# Patient Record
Sex: MALE | Race: WHITE | NOT HISPANIC OR LATINO | Employment: FULL TIME | URBAN - METROPOLITAN AREA
[De-identification: names, ages, dates, MRNs, and addresses within clinical notes are randomized per-mention and may not be internally consistent; named-entity substitution may affect disease eponyms.]

---

## 2018-02-25 ENCOUNTER — HOSPITAL ENCOUNTER (EMERGENCY)
Facility: HOSPITAL | Age: 23
Discharge: HOME | End: 2018-02-25

## 2018-02-25 VITALS
BODY MASS INDEX: 39.99 KG/M2 | SYSTOLIC BLOOD PRESSURE: 138 MMHG | DIASTOLIC BLOOD PRESSURE: 90 MMHG | TEMPERATURE: 98 F | OXYGEN SATURATION: 98 % | HEIGHT: 65 IN | RESPIRATION RATE: 20 BRPM | HEART RATE: 104 BPM | WEIGHT: 240 LBS
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
## 2018-02-25 PROCEDURE — 99283 EMERGENCY DEPT VISIT LOW MDM: CPT

## 2018-02-25 RX ORDER — HYDROXYZINE HYDROCHLORIDE 25 MG/1
25 TABLET, FILM COATED ORAL EVERY 6 HOURS
Qty: 12 TABLET | Refills: 0 | Status: ON HOLD | OUTPATIENT
Start: 2018-02-25 | End: 2022-05-16 | Stop reason: HOSPADM

## 2018-02-25 RX ORDER — SULFAMETHOXAZOLE AND TRIMETHOPRIM 800; 160 MG/1; MG/1
1 TABLET ORAL 2 TIMES DAILY
Qty: 14 TABLET | Refills: 0 | Status: SHIPPED | OUTPATIENT
Start: 2018-02-25 | End: 2018-02-28 | Stop reason: ALTCHOICE

## 2018-02-25 NOTE — ED PROVIDER NOTES
Encounter Date: 2/25/2018       History     Chief Complaint   Patient presents with    Insect Bite     pt thinks spider bit him to left upper thigh. states it is red and warm. nontender     22-year-old male presents to emergency room complaining of insect bite to the left upper thigh that started earlier today.  Patient states he first noticed itching to his left thigh then noticed redness and believes he was bit by a spider.  Did not see a spider.  Denies any pain to the area.  Denies any drainage.  Denies any fever.  Reports up-to-date tetanus.          Review of patient's allergies indicates:  No Known Allergies  History reviewed. No pertinent past medical history.  Past Surgical History:   Procedure Laterality Date    CHOLECYSTECTOMY       History reviewed. No pertinent family history.  Social History   Substance Use Topics    Smoking status: Current Every Day Smoker     Packs/day: 1.50    Smokeless tobacco: Not on file    Alcohol use No     Review of Systems   Constitutional: Negative for fever.   HENT: Negative for sore throat.    Respiratory: Negative for shortness of breath.    Cardiovascular: Negative for chest pain.   Gastrointestinal: Negative for nausea.   Genitourinary: Negative for dysuria.   Musculoskeletal: Negative for back pain.   Skin: Positive for rash.   Neurological: Negative for weakness.   Hematological: Does not bruise/bleed easily.   All other systems reviewed and are negative.      Physical Exam     Initial Vitals [02/25/18 1546]   BP Pulse Resp Temp SpO2   (!) 138/90 104 20 98.1 °F (36.7 °C) 98 %      MAP       106         Physical Exam    Nursing note and vitals reviewed.  Constitutional: He appears well-developed and well-nourished. He is not diaphoretic. No distress.   HENT:   Head: Normocephalic and atraumatic.   Eyes: Conjunctivae are normal.   Neck: Normal range of motion. Neck supple.   Cardiovascular: Normal rate and regular rhythm.   Pulmonary/Chest: Breath sounds normal. No  respiratory distress. He exhibits no tenderness.   Abdominal: Soft. He exhibits no distension. There is no tenderness.   Musculoskeletal: Normal range of motion. He exhibits no tenderness.   Neurological: He is alert and oriented to person, place, and time. He has normal strength.   Skin: Skin is warm and dry. Capillary refill takes less than 2 seconds. There is erythema (5cm area of redness to L thigh).   Psychiatric: He has a normal mood and affect. His behavior is normal. Judgment and thought content normal.         ED Course   Procedures  Labs Reviewed - No data to display          Medical Decision Making:   Initial Assessment:   22-year-old male presents to emergency room complaining of insect bite to the left upper thigh that started earlier today.  Patient states he first noticed itching to his left thigh then noticed redness and believes he was bit by a spider.  Did not see a spider.  Denies any pain to the area.  Denies any drainage.  Denies any fever.  Reports up-to-date tetanus.  Differential Diagnosis:   Abscess, ALLERGIC reaction, cellulitis, folliculitis,  ED Management:  Patient will be discharged with antibiotic and hydroxyzine.  Instructed to apply warm compresses to the area.  Monitor for worsening signs of infection.  Return to emergency room if any worsening symptoms present.  Patient verbalizes understanding.                      Clinical Impression:   The encounter diagnosis was Insect bite, initial encounter.                           Taryn Chino NP  02/25/18 [id number]

## 2018-02-28 ENCOUNTER — HOSPITAL ENCOUNTER (EMERGENCY)
Facility: HOSPITAL | Age: 23
Discharge: HOME | End: 2018-02-28
Attending: SURGERY

## 2018-02-28 VITALS
SYSTOLIC BLOOD PRESSURE: 117 MMHG | WEIGHT: 245 LBS | HEART RATE: 98 BPM | RESPIRATION RATE: 18 BRPM | HEIGHT: 68 IN | OXYGEN SATURATION: 96 % | DIASTOLIC BLOOD PRESSURE: 58 MMHG | TEMPERATURE: 99 F | BODY MASS INDEX: 37.13 KG/M2

## 2018-02-28 DIAGNOSIS — L03.116 CELLULITIS OF LEFT LOWER EXTREMITY: Primary | ICD-10-CM

## 2018-02-28 PROCEDURE — 10060 I&D ABSCESS SIMPLE/SINGLE: CPT | Mod: LT

## 2018-02-28 PROCEDURE — 63600175 PHARM REV CODE 636 W HCPCS: Performed by: PHYSICIAN ASSISTANT

## 2018-02-28 PROCEDURE — 99283 EMERGENCY DEPT VISIT LOW MDM: CPT | Mod: 25

## 2018-02-28 PROCEDURE — 96372 THER/PROPH/DIAG INJ SC/IM: CPT

## 2018-02-28 PROCEDURE — 25000003 PHARM REV CODE 250: Performed by: PHYSICIAN ASSISTANT

## 2018-02-28 RX ORDER — LIDOCAINE HYDROCHLORIDE 10 MG/ML
10 INJECTION INFILTRATION; PERINEURAL
Status: COMPLETED | OUTPATIENT
Start: 2018-02-28 | End: 2018-02-28

## 2018-02-28 RX ORDER — CEFTRIAXONE 1 G/1
1 INJECTION, POWDER, FOR SOLUTION INTRAMUSCULAR; INTRAVENOUS
Status: COMPLETED | OUTPATIENT
Start: 2018-02-28 | End: 2018-02-28

## 2018-02-28 RX ORDER — CLINDAMYCIN HYDROCHLORIDE 300 MG/1
300 CAPSULE ORAL EVERY 6 HOURS
Qty: 28 CAPSULE | Refills: 0 | Status: SHIPPED | OUTPATIENT
Start: 2018-02-28 | End: 2018-03-07

## 2018-02-28 RX ADMIN — LIDOCAINE HYDROCHLORIDE 10 ML: 10 INJECTION, SOLUTION INFILTRATION; PERINEURAL at 07:02

## 2018-02-28 RX ADMIN — CEFTRIAXONE SODIUM 1 G: 1 INJECTION, POWDER, FOR SOLUTION INTRAMUSCULAR; INTRAVENOUS at 07:02

## 2018-03-01 NOTE — DISCHARGE INSTRUCTIONS
Stop taking the bactrim and change to the new antibiotics.  Keep the area clean and dry.  See your primary care provider in a few days.  For worsening symptoms, chest pain, shortness of breath, increased abdominal pain, high grade fever, stroke or stroke like symptoms, immediately go to the nearest Emergency Room or call 911 as soon as possible.

## 2018-03-01 NOTE — ED PROVIDER NOTES
"Encounter Date: 2/28/2018       History     Chief Complaint   Patient presents with    Abscess     Pt states has "Spider bite" to left thigh.  States was seen 4 days ago and given antibiotics, states "it's much bigger now."  Denies drainage, denies seeing spider.      Patient is a 22 year old male who presents with worsening abscess to the left thigh. He was seen here on 2/25 and started on Bactrim, he reports worsening redness despite being on the bactrim. He reports mild tenderness. No drainage. His family suspects a spider bite but the patient denies seeing a spider. He reports being compliant with his antibiotics. He denied fever or chills.      The history is provided by the patient.     Review of patient's allergies indicates:  No Known Allergies  History reviewed. No pertinent past medical history.  Past Surgical History:   Procedure Laterality Date    CHOLECYSTECTOMY       History reviewed. No pertinent family history.  Social History   Substance Use Topics    Smoking status: Current Every Day Smoker     Packs/day: 1.50    Smokeless tobacco: Not on file    Alcohol use No     Review of Systems   Constitutional: Negative for activity change, appetite change, chills and fever.   HENT: Negative for congestion, rhinorrhea and sore throat.    Eyes: Negative for redness and visual disturbance.   Respiratory: Negative for cough, chest tightness and shortness of breath.    Cardiovascular: Negative for chest pain.   Gastrointestinal: Negative for abdominal pain, diarrhea, nausea and vomiting.   Genitourinary: Negative for dysuria and frequency.   Musculoskeletal: Negative for back pain, neck pain and neck stiffness.   Skin: Positive for color change and wound. Negative for rash.   Neurological: Negative for dizziness, syncope, numbness and headaches.       Physical Exam     Vitals:    02/28/18 1807 02/28/18 1950 02/28/18 1954   BP: (!) 140/79  (!) 117/58   BP Location: Left arm  Right arm   Patient Position: " "Sitting  Lying   Pulse: (!) 113 98 98   Resp: 20  18   Temp: 98.8 °F (37.1 °C)  98.9 °F (37.2 °C)   TempSrc: Oral  Oral   SpO2: 100%  96%   Weight: 111.1 kg (245 lb)     Height: 5' 8" (1.727 m)         Physical Exam    Constitutional: Vital signs are normal. He appears well-developed and well-nourished. He is cooperative.  Non-toxic appearance. He does not have a sickly appearance.   HENT:   Head: Normocephalic and atraumatic.   Right Ear: External ear normal.   Left Ear: External ear normal.   Nose: Nose normal.   Mouth/Throat: Oropharynx is clear and moist.   Eyes: Conjunctivae and lids are normal. Pupils are equal, round, and reactive to light.   Neck: Normal range of motion and full passive range of motion without pain. Neck supple.   Cardiovascular: Normal rate and regular rhythm.   Pulmonary/Chest: Breath sounds normal. He has no wheezes. He has no rales.   Abdominal: Soft. Normal appearance. There is no tenderness. There is no rigidity, no rebound and no guarding.   Neurological: He is alert and oriented to person, place, and time.   Skin: Skin is warm, dry and intact. Abscess noted. No rash noted. There is erythema.        Large 10 cm x 10 cm area of induration. There is a small area of induration in the center. There is no significant fluctuance. Mild tenderness to palpation.          ED Course   I & D - Incision and Drainage  Date/Time: 2/28/2018 10:20 PM  Performed by: YADI ARMSTRONG  Authorized by: NICOLASA BONILLA III   Type: abscess  Body area: lower extremity  Location details: left leg  Anesthesia: local infiltration    Anesthesia:  Local Anesthetic: lidocaine 1% without epinephrine  Anesthetic total: 3 mL  Scalpel size: 11  Incision type: single straight  Complexity: simple  Drainage: bloody  Drainage amount: scant  Wound treatment: incision  Patient tolerance: Patient tolerated the procedure well with no immediate complications        Labs Reviewed - No data to display        "   Medical Decision Making:   History:   Old Medical Records: I decided to obtain old medical records.  Initial Assessment:   Patient is a 22 year old male who presents with worsening abscess to the left thigh. He was seen here on 2/25 and started on Bactrim, he reports worsening redness despite being on the bactrim. He reports mild tenderness. No drainage. His family suspects a spider bite but the patient denies seeing a spider. He reports being compliant with his antibiotics. He denied fever or chills.  Differential Diagnosis:   Abscess  Cellulitis  folliculitis   ED Management:  Patient denied fever. I&D performed, see procedure note. Patient tolerated well. Patient was given instructions on wound care. Antibiotics changed to clindamcyin. He was given a dose of IM rocephin. Follow up with primary care provider. Return precautions given. All questions answered. Case was discussed with Dr. Hill who has evaluated the patient and is in agreement with the plan of care.                         Clinical Impression:   The encounter diagnosis was Cellulitis of left lower extremity.                           Tina Salinas PA-C  02/28/18 0041

## 2022-05-13 ENCOUNTER — HOSPITAL ENCOUNTER (INPATIENT)
Facility: HOSPITAL | Age: 27
LOS: 3 days | Discharge: HOME | DRG: 270 | End: 2022-05-17
Attending: EMERGENCY MEDICINE | Admitting: EMERGENCY MEDICINE
Payer: COMMERCIAL

## 2022-05-13 DIAGNOSIS — I82.402 ACUTE DEEP VEIN THROMBOSIS (DVT) OF LEFT LOWER EXTREMITY, UNSPECIFIED VEIN: Primary | ICD-10-CM

## 2022-05-13 DIAGNOSIS — I26.99 ACUTE PULMONARY EMBOLISM, UNSPECIFIED PULMONARY EMBOLISM TYPE, UNSPECIFIED WHETHER ACUTE COR PULMONALE PRESENT: ICD-10-CM

## 2022-05-13 DIAGNOSIS — F90.8 ATTENTION DEFICIT HYPERACTIVITY DISORDER (ADHD), OTHER TYPE: ICD-10-CM

## 2022-05-13 DIAGNOSIS — M79.89 SWELLING OF LEFT LOWER EXTREMITY: ICD-10-CM

## 2022-05-13 DIAGNOSIS — E66.01 CLASS 2 SEVERE OBESITY WITH SERIOUS COMORBIDITY AND BODY MASS INDEX (BMI) OF 38.0 TO 38.9 IN ADULT, UNSPECIFIED OBESITY TYPE: ICD-10-CM

## 2022-05-13 DIAGNOSIS — I26.99 BILATERAL PULMONARY EMBOLISM: ICD-10-CM

## 2022-05-13 DIAGNOSIS — R00.0 TACHYCARDIA: ICD-10-CM

## 2022-05-13 DIAGNOSIS — I82.412 ACUTE DEEP VEIN THROMBOSIS (DVT) OF FEMORAL VEIN OF LEFT LOWER EXTREMITY: ICD-10-CM

## 2022-05-13 DIAGNOSIS — R07.9 CHEST PAIN: ICD-10-CM

## 2022-05-13 DIAGNOSIS — Z72.0 TOBACCO USE: ICD-10-CM

## 2022-05-13 DIAGNOSIS — I10 ESSENTIAL HYPERTENSION: ICD-10-CM

## 2022-05-13 DIAGNOSIS — F90.9 ATTENTION DEFICIT HYPERACTIVITY DISORDER (ADHD), UNSPECIFIED ADHD TYPE: ICD-10-CM

## 2022-05-13 PROBLEM — E66.812 CLASS 2 SEVERE OBESITY WITH SERIOUS COMORBIDITY IN ADULT: Chronic | Status: ACTIVE | Noted: 2022-05-13

## 2022-05-13 LAB
ALBUMIN SERPL BCP-MCNC: 3.8 G/DL (ref 3.5–5.2)
ALP SERPL-CCNC: 66 U/L (ref 55–135)
ALT SERPL W/O P-5'-P-CCNC: 27 U/L (ref 10–44)
ANION GAP SERPL CALC-SCNC: 10 MMOL/L (ref 8–16)
AST SERPL-CCNC: 20 U/L (ref 10–40)
BASOPHILS # BLD AUTO: 0.1 K/UL (ref 0–0.2)
BASOPHILS NFR BLD: 0.8 % (ref 0–1.9)
BILIRUB SERPL-MCNC: 0.7 MG/DL (ref 0.1–1)
BILIRUB UR QL STRIP: NEGATIVE
BNP SERPL-MCNC: <10 PG/ML (ref 0–99)
BUN SERPL-MCNC: 16 MG/DL (ref 6–20)
CALCIUM SERPL-MCNC: 9.1 MG/DL (ref 8.7–10.5)
CHLORIDE SERPL-SCNC: 104 MMOL/L (ref 95–110)
CLARITY UR: CLEAR
CO2 SERPL-SCNC: 25 MMOL/L (ref 23–29)
COLOR UR: YELLOW
CREAT SERPL-MCNC: 1 MG/DL (ref 0.5–1.4)
CTP QC/QA: YES
D DIMER PPP IA.FEU-MCNC: 12.72 MG/L FEU
DIFFERENTIAL METHOD: ABNORMAL
EOSINOPHIL # BLD AUTO: 0.3 K/UL (ref 0–0.5)
EOSINOPHIL NFR BLD: 2.5 % (ref 0–8)
ERYTHROCYTE [DISTWIDTH] IN BLOOD BY AUTOMATED COUNT: 12.1 % (ref 11.5–14.5)
EST. GFR  (AFRICAN AMERICAN): >60 ML/MIN/1.73 M^2
EST. GFR  (NON AFRICAN AMERICAN): >60 ML/MIN/1.73 M^2
GLUCOSE SERPL-MCNC: 103 MG/DL (ref 70–110)
GLUCOSE UR QL STRIP: NEGATIVE
HCT VFR BLD AUTO: 44.9 % (ref 40–54)
HGB BLD-MCNC: 15.1 G/DL (ref 14–18)
HGB UR QL STRIP: NEGATIVE
IMM GRANULOCYTES # BLD AUTO: 0.08 K/UL (ref 0–0.04)
IMM GRANULOCYTES NFR BLD AUTO: 0.6 % (ref 0–0.5)
KETONES UR QL STRIP: NEGATIVE
LEUKOCYTE ESTERASE UR QL STRIP: NEGATIVE
LYMPHOCYTES # BLD AUTO: 2.5 K/UL (ref 1–4.8)
LYMPHOCYTES NFR BLD: 19.5 % (ref 18–48)
MCH RBC QN AUTO: 29.8 PG (ref 27–31)
MCHC RBC AUTO-ENTMCNC: 33.6 G/DL (ref 32–36)
MCV RBC AUTO: 89 FL (ref 82–98)
MONOCYTES # BLD AUTO: 1.1 K/UL (ref 0.3–1)
MONOCYTES NFR BLD: 8.1 % (ref 4–15)
NEUTROPHILS # BLD AUTO: 8.9 K/UL (ref 1.8–7.7)
NEUTROPHILS NFR BLD: 68.5 % (ref 38–73)
NITRITE UR QL STRIP: NEGATIVE
NRBC BLD-RTO: 0 /100 WBC
PH UR STRIP: 6 [PH] (ref 5–8)
PLATELET # BLD AUTO: 133 K/UL (ref 150–450)
PMV BLD AUTO: 10.3 FL (ref 9.2–12.9)
POTASSIUM SERPL-SCNC: 4.3 MMOL/L (ref 3.5–5.1)
PROT SERPL-MCNC: 7.6 G/DL (ref 6–8.4)
PROT UR QL STRIP: NEGATIVE
RBC # BLD AUTO: 5.07 M/UL (ref 4.6–6.2)
SARS-COV-2 RDRP RESP QL NAA+PROBE: NEGATIVE
SODIUM SERPL-SCNC: 139 MMOL/L (ref 136–145)
SP GR UR STRIP: 1.02 (ref 1–1.03)
TROPONIN I SERPL DL<=0.01 NG/ML-MCNC: <0.006 NG/ML (ref 0–0.03)
URN SPEC COLLECT METH UR: NORMAL
UROBILINOGEN UR STRIP-ACNC: NEGATIVE EU/DL
WBC # BLD AUTO: 12.97 K/UL (ref 3.9–12.7)

## 2022-05-13 PROCEDURE — 99285 EMERGENCY DEPT VISIT HI MDM: CPT | Mod: 25

## 2022-05-13 PROCEDURE — G0378 HOSPITAL OBSERVATION PER HR: HCPCS

## 2022-05-13 PROCEDURE — 63600175 PHARM REV CODE 636 W HCPCS: Performed by: PHYSICIAN ASSISTANT

## 2022-05-13 PROCEDURE — 25500020 PHARM REV CODE 255: Performed by: EMERGENCY MEDICINE

## 2022-05-13 PROCEDURE — 84484 ASSAY OF TROPONIN QUANT: CPT | Performed by: PHYSICIAN ASSISTANT

## 2022-05-13 PROCEDURE — 85379 FIBRIN DEGRADATION QUANT: CPT | Performed by: PHYSICIAN ASSISTANT

## 2022-05-13 PROCEDURE — 96372 THER/PROPH/DIAG INJ SC/IM: CPT | Mod: 59 | Performed by: PHYSICIAN ASSISTANT

## 2022-05-13 PROCEDURE — 83880 ASSAY OF NATRIURETIC PEPTIDE: CPT | Performed by: PHYSICIAN ASSISTANT

## 2022-05-13 PROCEDURE — 96376 TX/PRO/DX INJ SAME DRUG ADON: CPT | Performed by: EMERGENCY MEDICINE

## 2022-05-13 PROCEDURE — 93010 ELECTROCARDIOGRAM REPORT: CPT | Mod: ,,, | Performed by: INTERNAL MEDICINE

## 2022-05-13 PROCEDURE — 85025 COMPLETE CBC W/AUTO DIFF WBC: CPT | Performed by: PHYSICIAN ASSISTANT

## 2022-05-13 PROCEDURE — 25000003 PHARM REV CODE 250: Performed by: PHYSICIAN ASSISTANT

## 2022-05-13 PROCEDURE — 93005 ELECTROCARDIOGRAM TRACING: CPT

## 2022-05-13 PROCEDURE — 63600175 PHARM REV CODE 636 W HCPCS: Performed by: HOSPITALIST

## 2022-05-13 PROCEDURE — 96374 THER/PROPH/DIAG INJ IV PUSH: CPT

## 2022-05-13 PROCEDURE — 96375 TX/PRO/DX INJ NEW DRUG ADDON: CPT

## 2022-05-13 PROCEDURE — 93010 EKG 12-LEAD: ICD-10-PCS | Mod: ,,, | Performed by: INTERNAL MEDICINE

## 2022-05-13 PROCEDURE — U0002 COVID-19 LAB TEST NON-CDC: HCPCS | Performed by: PHYSICIAN ASSISTANT

## 2022-05-13 PROCEDURE — 96376 TX/PRO/DX INJ SAME DRUG ADON: CPT

## 2022-05-13 PROCEDURE — 80053 COMPREHEN METABOLIC PANEL: CPT | Performed by: PHYSICIAN ASSISTANT

## 2022-05-13 PROCEDURE — 81003 URINALYSIS AUTO W/O SCOPE: CPT | Performed by: PHYSICIAN ASSISTANT

## 2022-05-13 PROCEDURE — 25000003 PHARM REV CODE 250: Performed by: HOSPITALIST

## 2022-05-13 PROCEDURE — 96361 HYDRATE IV INFUSION ADD-ON: CPT

## 2022-05-13 RX ORDER — PROCHLORPERAZINE EDISYLATE 5 MG/ML
5 INJECTION INTRAMUSCULAR; INTRAVENOUS EVERY 6 HOURS PRN
Status: DISCONTINUED | OUTPATIENT
Start: 2022-05-13 | End: 2022-05-17 | Stop reason: HOSPADM

## 2022-05-13 RX ORDER — SODIUM CHLORIDE 0.9 % (FLUSH) 0.9 %
10 SYRINGE (ML) INJECTION EVERY 8 HOURS PRN
Status: DISCONTINUED | OUTPATIENT
Start: 2022-05-13 | End: 2022-05-17 | Stop reason: HOSPADM

## 2022-05-13 RX ORDER — ONDANSETRON 2 MG/ML
4 INJECTION INTRAMUSCULAR; INTRAVENOUS EVERY 8 HOURS PRN
Status: DISCONTINUED | OUTPATIENT
Start: 2022-05-13 | End: 2022-05-17 | Stop reason: HOSPADM

## 2022-05-13 RX ORDER — OXYCODONE AND ACETAMINOPHEN 5; 325 MG/1; MG/1
1 TABLET ORAL EVERY 4 HOURS PRN
Status: DISCONTINUED | OUTPATIENT
Start: 2022-05-13 | End: 2022-05-17 | Stop reason: HOSPADM

## 2022-05-13 RX ORDER — ACETAMINOPHEN 325 MG/1
650 TABLET ORAL EVERY 6 HOURS PRN
Status: DISCONTINUED | OUTPATIENT
Start: 2022-05-13 | End: 2022-05-17 | Stop reason: HOSPADM

## 2022-05-13 RX ORDER — SIMETHICONE 80 MG
1 TABLET,CHEWABLE ORAL 4 TIMES DAILY PRN
Status: DISCONTINUED | OUTPATIENT
Start: 2022-05-13 | End: 2022-05-17 | Stop reason: HOSPADM

## 2022-05-13 RX ORDER — PROPRANOLOL HYDROCHLORIDE 40 MG/1
40 TABLET ORAL 2 TIMES DAILY
Status: DISCONTINUED | OUTPATIENT
Start: 2022-05-13 | End: 2022-05-17 | Stop reason: HOSPADM

## 2022-05-13 RX ORDER — MORPHINE SULFATE 4 MG/ML
3 INJECTION, SOLUTION INTRAMUSCULAR; INTRAVENOUS
Status: COMPLETED | OUTPATIENT
Start: 2022-05-13 | End: 2022-05-13

## 2022-05-13 RX ORDER — ONDANSETRON 2 MG/ML
4 INJECTION INTRAMUSCULAR; INTRAVENOUS
Status: COMPLETED | OUTPATIENT
Start: 2022-05-13 | End: 2022-05-13

## 2022-05-13 RX ORDER — ENOXAPARIN SODIUM 150 MG/ML
1 INJECTION SUBCUTANEOUS
Status: COMPLETED | OUTPATIENT
Start: 2022-05-13 | End: 2022-05-13

## 2022-05-13 RX ORDER — MAG HYDROX/ALUMINUM HYD/SIMETH 200-200-20
30 SUSPENSION, ORAL (FINAL DOSE FORM) ORAL 4 TIMES DAILY PRN
Status: DISCONTINUED | OUTPATIENT
Start: 2022-05-13 | End: 2022-05-17 | Stop reason: HOSPADM

## 2022-05-13 RX ORDER — IBUPROFEN 200 MG
24 TABLET ORAL
Status: DISCONTINUED | OUTPATIENT
Start: 2022-05-13 | End: 2022-05-17 | Stop reason: HOSPADM

## 2022-05-13 RX ORDER — IPRATROPIUM BROMIDE AND ALBUTEROL SULFATE 2.5; .5 MG/3ML; MG/3ML
3 SOLUTION RESPIRATORY (INHALATION) EVERY 4 HOURS PRN
Status: DISCONTINUED | OUTPATIENT
Start: 2022-05-13 | End: 2022-05-17 | Stop reason: HOSPADM

## 2022-05-13 RX ORDER — PROPRANOLOL HYDROCHLORIDE 40 MG/1
40 TABLET ORAL 2 TIMES DAILY
COMMUNITY
Start: 2022-03-04

## 2022-05-13 RX ORDER — NALOXONE HCL 0.4 MG/ML
0.02 VIAL (ML) INJECTION
Status: DISCONTINUED | OUTPATIENT
Start: 2022-05-13 | End: 2022-05-17 | Stop reason: HOSPADM

## 2022-05-13 RX ORDER — POLYETHYLENE GLYCOL 3350 17 G/17G
17 POWDER, FOR SOLUTION ORAL DAILY
Status: DISCONTINUED | OUTPATIENT
Start: 2022-05-14 | End: 2022-05-17 | Stop reason: HOSPADM

## 2022-05-13 RX ORDER — ENOXAPARIN SODIUM 150 MG/ML
1 INJECTION SUBCUTANEOUS
Status: DISCONTINUED | OUTPATIENT
Start: 2022-05-13 | End: 2022-05-17 | Stop reason: HOSPADM

## 2022-05-13 RX ORDER — TALC
6 POWDER (GRAM) TOPICAL NIGHTLY PRN
Status: DISCONTINUED | OUTPATIENT
Start: 2022-05-13 | End: 2022-05-17 | Stop reason: HOSPADM

## 2022-05-13 RX ORDER — GLUCAGON 1 MG
1 KIT INJECTION
Status: DISCONTINUED | OUTPATIENT
Start: 2022-05-13 | End: 2022-05-17 | Stop reason: HOSPADM

## 2022-05-13 RX ORDER — IBUPROFEN 200 MG
16 TABLET ORAL
Status: DISCONTINUED | OUTPATIENT
Start: 2022-05-13 | End: 2022-05-17 | Stop reason: HOSPADM

## 2022-05-13 RX ADMIN — ONDANSETRON 4 MG: 2 INJECTION INTRAMUSCULAR; INTRAVENOUS at 10:05

## 2022-05-13 RX ADMIN — ENOXAPARIN SODIUM 105 MG: 120 INJECTION, SOLUTION INTRAVENOUS; SUBCUTANEOUS at 07:05

## 2022-05-13 RX ADMIN — SODIUM CHLORIDE 1000 ML: 0.9 INJECTION, SOLUTION INTRAVENOUS at 06:05

## 2022-05-13 RX ADMIN — ONDANSETRON 4 MG: 2 INJECTION INTRAMUSCULAR; INTRAVENOUS at 06:05

## 2022-05-13 RX ADMIN — IOHEXOL 85 ML: 350 INJECTION, SOLUTION INTRAVENOUS at 07:05

## 2022-05-13 RX ADMIN — MORPHINE SULFATE 3 MG: 4 INJECTION INTRAVENOUS at 06:05

## 2022-05-13 RX ADMIN — OXYCODONE AND ACETAMINOPHEN 1 TABLET: 5; 325 TABLET ORAL at 10:05

## 2022-05-13 RX ADMIN — MORPHINE SULFATE 3 MG: 4 INJECTION INTRAVENOUS at 08:05

## 2022-05-13 RX ADMIN — PROPRANOLOL HYDROCHLORIDE 40 MG: 40 TABLET ORAL at 11:05

## 2022-05-13 NOTE — Clinical Note
The groin was prepped. The site was prepped with ChloraPrep. The site was clipped. The patient was draped. The patient was positioned supine. Bilateral popliteal

## 2022-05-13 NOTE — ED TRIAGE NOTES
"Pt. States, " I think I have a blood clot in my leg". Pt. reports he has discoloration, swelling, tenderness and pain to his left leg. Pt. Denies any long travel or sanitary habits. Pt. Reports he has a hx of rapid HR and HTN.   "

## 2022-05-13 NOTE — Clinical Note
0 ml of contrast were injected throughout the case. 50 mL of contrast was the total wasted during the case. 50 mL was the total amount used during the case.

## 2022-05-13 NOTE — ED PROVIDER NOTES
Encounter Date: 5/13/2022       History     Chief Complaint   Patient presents with    Leg Pain     Pt c/o left leg pain and swelling x4 days. Pt stated he had hx of blood clots.     26-year-old male with history of hypertension and previous DVT (no longer anticoagulated) presents to the emergency department for 5 day history of atraumatic left lower extremity pain associated with swelling.  Symptoms feel similar to his past DVT.  Denies chest pain and shortness of breath.  States he has history of sinus tachycardia that he takes propranolol for; compliant with propanolol today.  No medication prior to arrival.  Denies numbness and fever.    The history is provided by the patient.     Review of patient's allergies indicates:  No Known Allergies  Past Medical History:   Diagnosis Date    Hypertension      Past Surgical History:   Procedure Laterality Date    CHOLECYSTECTOMY       History reviewed. No pertinent family history.  Social History     Tobacco Use    Smoking status: Current Every Day Smoker     Packs/day: 1.50    Smokeless tobacco: Never Used   Substance Use Topics    Alcohol use: No    Drug use: Never     Review of Systems   Constitutional: Negative for fever.   Respiratory: Negative for shortness of breath.    Cardiovascular: Negative for chest pain.   Gastrointestinal: Negative for abdominal pain, nausea and vomiting.   Musculoskeletal: Positive for myalgias. Negative for back pain, joint swelling and neck pain.   Skin: Negative for color change and wound.   Neurological: Negative for numbness.   All other systems reviewed and are negative.      Physical Exam     Initial Vitals [05/13/22 1559]   BP Pulse Resp Temp SpO2   (!) 139/90 (!) 113 20 98.9 °F (37.2 °C) 99 %      MAP       --         Physical Exam    Nursing note and vitals reviewed.  Constitutional: He appears well-developed and well-nourished. He is not diaphoretic. No distress.   HENT:   Head: Normocephalic and atraumatic.   Nose: Nose  normal.   Eyes: Conjunctivae and EOM are normal. Pupils are equal, round, and reactive to light. Right eye exhibits no discharge. Left eye exhibits no discharge.   Neck: No tracheal deviation present. No JVD present.   Normal range of motion.  Cardiovascular: Regular rhythm and normal heart sounds. Tachycardia present.  Exam reveals no friction rub.    No murmur heard.  Pulmonary/Chest: Breath sounds normal. No stridor. No respiratory distress. He has no wheezes. He has no rhonchi. He has no rales. He exhibits no tenderness.   Musculoskeletal:         General: Normal range of motion.      Cervical back: Normal range of motion.      Comments: Unilateral swelling with associated tenderness to the left lower extremity along the posterior aspect.  No erythema or bony deformities.  Full ROM of lower extremity.  Distal extremity pulses 2+ and equal.  Ambulatory.     Neurological: He is alert and oriented to person, place, and time.   Skin: Skin is warm and dry. No rash and no abscess noted. No erythema. No pallor.         ED Course   Procedures  Labs Reviewed   CBC W/ AUTO DIFFERENTIAL - Abnormal; Notable for the following components:       Result Value    WBC 12.97 (*)     Platelets 133 (*)     Immature Granulocytes 0.6 (*)     Gran # (ANC) 8.9 (*)     Immature Grans (Abs) 0.08 (*)     Mono # 1.1 (*)     All other components within normal limits   D DIMER, QUANTITATIVE - Abnormal; Notable for the following components:    D-Dimer 12.72 (*)     All other components within normal limits   COMPREHENSIVE METABOLIC PANEL   TROPONIN I   B-TYPE NATRIURETIC PEPTIDE   URINALYSIS, REFLEX TO URINE CULTURE    Narrative:     Specimen Source->Urine   SARS-COV-2 RDRP GENE          Imaging Results           CTA Chest Non-Coronary (PE Study) (Final result)  Result time 05/13/22 20:11:05    Final result by Olivia St MD (05/13/22 20:11:05)                 Impression:      Suboptimal timing of the intravenous bolus.  However,  filling defects are seen bilaterally in the pulmonary arteries.  Findings suggest bilateral acute pulmonary emboli.    Pulmonary trunk greater in caliber than the corresponding ascending thoracic aorta.  Possibility of pulmonary hypertension should be considered.    Subtle bilateral ground-glass opacities.  Findings are nonspecific and may represent pneumonitis, crowding of the bronchovascular structures, atelectasis, or other etiology.  Moderate bilateral linear scarring or atelectasis.    Findings called to DILMA Mtz, at 20:05 on 05/13/2022.    This report was flagged in [employer] as abnormal.      Electronically signed by: Olivia St  Date:    05/13/2022  Time:    20:11             Narrative:    EXAMINATION:  CT PULMONARY ANGIOGRAM WITH CONTRAST    CLINICAL HISTORY:  Complaining of left leg pain and swelling x4 days.    TECHNIQUE:  CT of the chest with intravenous contrast for pulmonary artery angiogram was performed. Contiguous axial 1.25 mm images followed by 10 mm reconstructions with multiplanar and MIP reformations of the pulmonary arteries. No 3D post-angiographic imaging was performed on an independent workstation and reviewed.  Eighty-five ml of Omnipaque 350 was injected.    COMPARISON:  None.    FINDINGS:  There is suboptimal timing of the intravenous bolus.  Despite this, there are filling defects seen in bilateral pulmonary arteries.  There is no aortic aneurysm or aortic dissection.  The pulmonary trunk is greater in caliber than the adjacent ascending thoracic aorta.    There is subtle ground-glass opacities seen bilaterally.  There is moderate linear atelectasis or scarring.    There is no evidence of mediastinal, hilar, or axillary adenopathy.    There is no pleural or pericardial effusion.    The heart size is within normal limits.    In the visualized upper abdomen, there is fatty infiltration of the liver.  The gallbladder surgically absent..                               US Lower  Extremity Veins Left (Final result)  Result time 05/13/22 19:43:53    Final result by Alber Bernard MD (05/13/22 19:43:53)                 Impression:      Thrombosis of all of the deep veins in the left lower extremity.    Case discussed with DILMA Gray on 05/13/2022 at 19:42.      Electronically signed by: Alber Bernard MD  Date:    05/13/2022  Time:    19:43             Narrative:    EXAMINATION:  US LOWER EXTREMITY VEINS LEFT    CLINICAL HISTORY:  Other specified soft tissue disorders    TECHNIQUE:  Duplex and color flow Doppler evaluation and graded compression of the left lower extremity veins was performed.    COMPARISON:  None    FINDINGS:  Left thigh veins: The common femoral, femoral, popliteal, and deep femoral veins are thrombosed.    Left calf veins: The visualized calf veins are also thrombosed.    Contralateral CFV: The contralateral (right) common femoral vein is patent and free of thrombus.    Miscellaneous: None                               X-Ray Chest AP Portable (Final result)  Result time 05/13/22 18:23:20    Final result by Alber Bernard MD (05/13/22 18:23:20)                 Impression:      Vague perihilar airspace opacities.      Electronically signed by: Alber Bernard MD  Date:    05/13/2022  Time:    18:23             Narrative:    EXAMINATION:  XR CHEST AP PORTABLE    CLINICAL HISTORY:  Tachycardia, unspecified    TECHNIQUE:  Single frontal view of the chest was performed.    COMPARISON:  None    FINDINGS:  The trachea is unremarkable.  The cardiomediastinal silhouette is within normal limits.  There is no evidence of free air beneath the hemidiaphragms.  There are no pleural effusions.  There is no evidence of a pneumothorax.  There is no evidence of pneumomediastinum.  There are vague perihilar airspace opacities.  The osseous structures are unremarkable.                                 Medications   sodium chloride 0.9% bolus 1,000 mL (1,000 mLs Intravenous New Bag 5/13/22 1820)   morphine  injection 3 mg (3 mg Intravenous Given 5/13/22 1830)   ondansetron injection 4 mg (4 mg Intravenous Given 5/13/22 1819)   iohexoL (OMNIPAQUE 350) injection 85 mL (85 mLs Intravenous Given 5/13/22 1924)   enoxaparin injection 105 mg (105 mg Subcutaneous Given 5/13/22 1957)   morphine injection 3 mg (3 mg Intravenous Given 5/13/22 2050)     Medical Decision Making:   History:   Old Medical Records: I decided to obtain old medical records.  ED Management:  All deep veins of the left lower extremity are thrombosed.  He has associated bilateral pulmonary embolisms.  Remains slightly tachycardic despite fluids and propanolol taken prior to arrival.  He is not hypoxic.  There is no chest pain or shortness of breath.  No ischemic limb.  No signs of infectious process.  Case has been reviewed with cardiologist, Dr. Marshall; suitable for thrombectomy but may not be performed emergently unless pt decompensates.  Started on Lovenox. Pt continues to have extensive pain secondary to clot burden; will place in observation for pain control. Patient agreeable.                      Clinical Impression:   Final diagnoses:  [M79.89] Swelling of left lower extremity  [R00.0] Tachycardia  [I82.402] Acute deep vein thrombosis (DVT) of left lower extremity, unspecified vein (Primary)  [I26.99] Acute pulmonary embolism, unspecified pulmonary embolism type, unspecified whether acute cor pulmonale present          ED Disposition Condition    Observation               Luis Enrique Gray PA-C  05/13/22 [id number]

## 2022-05-13 NOTE — Clinical Note
55 ml of contrast were injected throughout the case. 45 mL of contrast was the total wasted during the case. 100 mL was the total amount used during the case.

## 2022-05-13 NOTE — Clinical Note
A percutaneous stick to the left popliteal vein was performed. Ultrasound guidance was used to obtain access.

## 2022-05-14 LAB
ERYTHROCYTE [DISTWIDTH] IN BLOOD BY AUTOMATED COUNT: 12.2 % (ref 11.5–14.5)
HCT VFR BLD AUTO: 44.1 % (ref 40–54)
HGB BLD-MCNC: 14.1 G/DL (ref 14–18)
MCH RBC QN AUTO: 28.8 PG (ref 27–31)
MCHC RBC AUTO-ENTMCNC: 32 G/DL (ref 32–36)
MCV RBC AUTO: 90 FL (ref 82–98)
PLATELET # BLD AUTO: 151 K/UL (ref 150–450)
PMV BLD AUTO: 9.8 FL (ref 9.2–12.9)
RBC # BLD AUTO: 4.89 M/UL (ref 4.6–6.2)
WBC # BLD AUTO: 14.48 K/UL (ref 3.9–12.7)

## 2022-05-14 PROCEDURE — 85027 COMPLETE CBC AUTOMATED: CPT | Performed by: HOSPITALIST

## 2022-05-14 PROCEDURE — 36415 COLL VENOUS BLD VENIPUNCTURE: CPT | Performed by: HOSPITALIST

## 2022-05-14 PROCEDURE — 63600175 PHARM REV CODE 636 W HCPCS: Performed by: HOSPITALIST

## 2022-05-14 PROCEDURE — 25000003 PHARM REV CODE 250: Performed by: HOSPITALIST

## 2022-05-14 PROCEDURE — 11000001 HC ACUTE MED/SURG PRIVATE ROOM

## 2022-05-14 PROCEDURE — 99900035 HC TECH TIME PER 15 MIN (STAT)

## 2022-05-14 PROCEDURE — 96372 THER/PROPH/DIAG INJ SC/IM: CPT | Performed by: HOSPITALIST

## 2022-05-14 PROCEDURE — 94760 N-INVAS EAR/PLS OXIMETRY 1: CPT

## 2022-05-14 RX ORDER — IBUPROFEN 200 MG
1 TABLET ORAL DAILY
Status: DISCONTINUED | OUTPATIENT
Start: 2022-05-15 | End: 2022-05-17 | Stop reason: HOSPADM

## 2022-05-14 RX ADMIN — PROPRANOLOL HYDROCHLORIDE 40 MG: 40 TABLET ORAL at 09:05

## 2022-05-14 RX ADMIN — OXYCODONE AND ACETAMINOPHEN 1 TABLET: 5; 325 TABLET ORAL at 09:05

## 2022-05-14 RX ADMIN — OXYCODONE AND ACETAMINOPHEN 1 TABLET: 5; 325 TABLET ORAL at 06:05

## 2022-05-14 RX ADMIN — ENOXAPARIN SODIUM 105 MG: 120 INJECTION SUBCUTANEOUS at 09:05

## 2022-05-14 RX ADMIN — OXYCODONE AND ACETAMINOPHEN 1 TABLET: 5; 325 TABLET ORAL at 11:05

## 2022-05-14 RX ADMIN — OXYCODONE AND ACETAMINOPHEN 1 TABLET: 5; 325 TABLET ORAL at 02:05

## 2022-05-14 RX ADMIN — OXYCODONE AND ACETAMINOPHEN 1 TABLET: 5; 325 TABLET ORAL at 05:05

## 2022-05-14 NOTE — PROGRESS NOTES
[facility] Medicine  Progress Note    Patient Name: Colten Serrato  MRN: [id number]  Patient Class: OP- Observation   Admission Date: 5/13/2022  Length of Stay: 0 days  Attending Physician: Joey Chairez MD  Primary Care Provider: Provider [employer]        Subjective:     Principal Problem:Acute deep vein thrombosis (DVT) of left lower extremity        HPI:  26 y.o. male with HTN, ADHD, tobacco use, obesity, and history of DVT as a child presents with a complaint of left lower ext pain.  Associated with some swelling, acute onset, described as similar to prior DVT, no known exacerbating or alleviating factors.  Denies fever, chills, cough, SOB, chest pain, palpitations, dizziness syncope, nausea,, diarrhea, abdominal pain complaining of black stools, dysuria.  In the ED he was found to have extensive DVT and bilateral PE.  Tachycardia noted.  Otherwise unremarkable for acute abnormality.  Started on full-dose enoxaparin.      Overview/Hospital Course:  No notes on file    Interval History: No acute events overnight.  No chest pain or shortness of breath.  Has significant pain in left leg, 7/10, which makes it impossible to take more than a few steps.  All questions answered and patient had no further complaints.    Review of Systems   Constitutional:  Negative for chills and fever.   HENT: Negative.     Eyes:  Negative for photophobia and visual disturbance.   Respiratory:  Negative for cough, chest tightness and shortness of breath.    Cardiovascular:  Positive for leg swelling. Negative for chest pain and palpitations.   Gastrointestinal:  Negative for abdominal pain, diarrhea, nausea and vomiting.   Genitourinary:  Negative for frequency, hematuria and urgency.   Musculoskeletal:  Positive for myalgias.   Skin:  Positive for color change. Negative for pallor, rash and wound.   Neurological:  Negative for light-headedness and headaches.   Psychiatric/Behavioral:  Negative for confusion  and decreased concentration.    Objective:     Vital Signs (Most Recent):  Temp: 97.9 °F (36.6 °C) (05/14/22 0708)  Pulse: 97 (05/14/22 0910)  Resp: 17 (05/14/22 1135)  BP: (!) 118/55 (05/14/22 0910)  SpO2: 95 % (05/14/22 0708)   Vital Signs (24h Range):  Temp:  [97.9 °F (36.6 °C)-99.3 °F (37.4 °C)] 97.9 °F (36.6 °C)  Pulse:  [] 97  Resp:  [16-20] 17  SpO2:  [94 %-100 %] 95 %  BP: (118-140)/(55-90) 118/55     Weight: 115.1 kg (253 lb 11.2 oz)  Body mass index is 38.58 kg/m².    Intake/Output Summary (Last 24 hours) at 5/14/2022 1527  Last data filed at 5/13/2022 2149  Gross per 24 hour   Intake 1000 ml   Output --   Net 1000 ml      Physical Exam  Vitals and nursing note reviewed.   Constitutional:       General: He is not in acute distress.     Appearance: He is well-developed.   HENT:      Head: Normocephalic and atraumatic.      Right Ear: External ear normal.      Left Ear: External ear normal.      Nose: Nose normal.   Eyes:      Conjunctiva/sclera: Conjunctivae normal.      Pupils: Pupils are equal, round, and reactive to light.   Cardiovascular:      Rate and Rhythm: Normal rate and regular rhythm.   Pulmonary:      Effort: Pulmonary effort is normal. No respiratory distress.      Breath sounds: Normal breath sounds. No wheezing or rales.   Abdominal:      General: Bowel sounds are normal. There is no distension.      Palpations: Abdomen is soft.      Tenderness: There is no abdominal tenderness.      Comments: No palpable hepatomegaly or splenomegaly   Musculoskeletal:         General: No tenderness. Normal range of motion.      Cervical back: Normal range of motion and neck supple.      Left lower leg: No edema.      Comments: Left leg is diffusely warm with slight diffuse erythema and TTP in calf and thigh.  No obvious swelling.     Skin:     General: Skin is warm and dry.      Findings: Erythema present.   Neurological:      Mental Status: He is alert and oriented to person, place, and time.    Psychiatric:         Thought Content: Thought content normal.       Significant Labs: All pertinent labs within the past 24 hours have been reviewed.    Significant Imaging: I have reviewed all pertinent imaging results/findings within the past 24 hours.      Assessment/Plan:      * Acute deep vein thrombosis (DVT) of left lower extremity  -Placed in observation - submitting for review to convert to inpatient  -Doppler US of left leg showed thrombosis of all of the deep veins in the left lower extremity  -CTA chest showed bilateral acute pulmonary emboli with pulmonary trunk greater in caliber than the corresponding ascending thoracic aorta and subtle bilateral ground-glass opacities.  ?early pulmonary infarct?  -Troponin and BNP are normal.  -Etiology of VTE unclear.  Appears unprovoked.  No prolonged immobility or trauma.  Notes his father had history of blood clots.  -Order echocardiogram to assess for right heart strain  -Discussed with patient and Dr. Marshall.  As patient has significant pain in his leg and can only take a few steps he will need thrombectomy.  Planning for this on Monday.  -Continue lovenox and close monitoring for now.    Bilateral pulmonary embolism  -Treatment as above.    Tobacco use  -Counselled on cessation 8 minutes.  -NRT ordered.    Class 2 severe obesity with serious comorbidity in adult  Body mass index is 38.58 kg/m². Morbid obesity complicates all aspects of disease management from diagnostic modalities to treatment. Weight loss encouraged and health benefits explained to patient.     Essential hypertension  -Well controlled  -Continue home propranolol.      VTE Risk Mitigation (From admission, onward)         Ordered     Reason for No Pharmacological VTE Prophylaxis  Once        Question:  Reasons:  Answer:  Already adequately anticoagulated on oral Anticoagulants    05/13/22 2129     IP VTE HIGH RISK PATIENT  Once         05/13/22 2129     Place sequential compression device   Until discontinued         05/13/22 2129     enoxaparin injection 105 mg  Every 12 hours (non-standard times)         05/13/22 2129                Discharge Planning   PAOLO:      Code Status: Full Code   Is the patient medically ready for discharge?:     Reason for patient still in hospital (select all that apply): Treatment  Discharge Plan A: Home with family                  Joey Chairez MD  Department of Hospital Medicine   [facility]

## 2022-05-14 NOTE — CARE UPDATE
Noted massive LLE DVT with severe pain and inability to take more than a few steps.  Also noted bilateral pulmonary emboli with possible evidence of right heart strain (CTA shows pulmonary trunk larger than adjacent aorta).  Discussed with patient and Dr. Marshall - will need thrombectomy of DVT which cannot be done until Monday.  Will need to keep patient in house until then.

## 2022-05-14 NOTE — H&P
[facility] Medicine  History & Physical    Patient Name: Colten Serrato  MRN: [id number]  Patient Class: OP- Observation  Admission Date: 5/13/2022  Attending Physician: Donavon Leach MD   Primary Care Provider: Provider [employer]         Patient information was obtained from patient, past medical records and ER records.     Subjective:     Principal Problem:Bilateral pulmonary embolism    Chief Complaint:   Chief Complaint   Patient presents with    Leg Pain     Pt c/o left leg pain and swelling x4 days. Pt stated he had hx of blood clots.        HPI: 26 y.o. male with HTN, ADHD, tobacco use, obesity, and history of DVT as a child presents with a complaint of left lower ext pain.  Associated with some swelling, acute onset, described as similar to prior DVT, no known exacerbating or alleviating factors.  Denies fever, chills, cough, SOB, chest pain, palpitations, dizziness syncope, nausea,, diarrhea, abdominal pain complaining of black stools, dysuria.  In the ED he was found to have extensive DVT and bilateral PE.  Tachycardia noted.  Otherwise unremarkable for acute abnormality.  Started on full-dose enoxaparin.      Past Medical History:   Diagnosis Date    Hypertension        Past Surgical History:   Procedure Laterality Date    CHOLECYSTECTOMY         Review of patient's allergies indicates:  No Known Allergies    No current facility-administered medications on file prior to encounter.     Current Outpatient Medications on File Prior to Encounter   Medication Sig    propranoloL (INDERAL) 40 MG tablet Take 40 mg by mouth 2 (two) times daily.    hydrOXYzine HCl (ATARAX) 25 MG tablet Take 1 tablet (25 mg total) by mouth every 6 (six) hours.     Family History    None       Tobacco Use    Smoking status: Current Every Day Smoker     Packs/day: 1.50    Smokeless tobacco: Never Used   Substance and Sexual Activity    Alcohol use: No    Drug use: Never    Sexual  activity: Not on file     Review of Systems   Constitutional:  Negative for chills and fever.   Eyes:  Negative for photophobia and visual disturbance.   Respiratory:  Negative for cough and shortness of breath.    Cardiovascular:  Positive for leg swelling. Negative for chest pain and palpitations.   Gastrointestinal:  Negative for abdominal pain, diarrhea, nausea and vomiting.   Genitourinary:  Negative for frequency, hematuria and urgency.   Musculoskeletal:  Positive for myalgias.   Skin:  Positive for color change. Negative for pallor, rash and wound.   Neurological:  Negative for light-headedness and headaches.   Psychiatric/Behavioral:  Negative for confusion and decreased concentration.    Objective:     Vital Signs (Most Recent):  Temp: 99.3 °F (37.4 °C) (05/13/22 1945)  Pulse: 105 (05/13/22 1945)  Resp: 18 (05/13/22 2050)  BP: 133/75 (05/13/22 1945)  SpO2: 96 % (05/13/22 1945) Vital Signs (24h Range):  Temp:  [98.9 °F (37.2 °C)-99.3 °F (37.4 °C)] 99.3 °F (37.4 °C)  Pulse:  [105-120] 105  Resp:  [16-20] 18  SpO2:  [96 %-100 %] 96 %  BP: (133-139)/(75-90) 133/75     Weight: 108.9 kg (240 lb)  Body mass index is 36.49 kg/m².    Physical Exam  Vitals and nursing note reviewed.   Constitutional:       General: He is not in acute distress.     Appearance: He is well-developed.   HENT:      Head: Normocephalic and atraumatic.      Right Ear: External ear normal.      Left Ear: External ear normal.      Nose: Nose normal.   Eyes:      Extraocular Movements: EOM normal.      Conjunctiva/sclera: Conjunctivae normal.      Pupils: Pupils are equal, round, and reactive to light.   Cardiovascular:      Rate and Rhythm: Normal rate and regular rhythm.   Pulmonary:      Effort: Pulmonary effort is normal. No respiratory distress.      Breath sounds: Normal breath sounds. No wheezing or rales.   Abdominal:      General: Bowel sounds are normal. There is no distension.      Palpations: Abdomen is soft.      Tenderness:  There is no abdominal tenderness.      Comments: No palpable hepatomegaly or splenomegaly   Musculoskeletal:         General: No tenderness. Normal range of motion.      Cervical back: Normal range of motion and neck supple.      Left lower leg: Edema present.   Skin:     General: Skin is warm and dry.      Findings: Erythema present.   Neurological:      Mental Status: He is alert and oriented to person, place, and time.   Psychiatric:         Thought Content: Thought content normal.         CRANIAL NERVES     CN III, IV, VI   Pupils are equal, round, and reactive to light.  Extraocular motions are normal.      Significant Labs: All pertinent labs within the past 24 hours have been reviewed.    Significant Imaging: I have reviewed all pertinent imaging results/findings within the past 24 hours.    Assessment/Plan:     * Bilateral pulmonary embolism  Stable on room air, no evidence of hemodynamic instability, continue anticoagulation.    Tobacco use  5 minutes spent counseling the patient on smoking cessation and he is not currently ready to stop smoking. He will be offereded a nicotine transdermal patch while hospitalized and monitored for withdrawal.  Will provide additional smoking cessation counseling prior to discharge.     Class 2 severe obesity with serious comorbidity in adult  Body mass index is 36.49 kg/m². Morbid obesity complicates all aspects of disease management from diagnostic modalities to treatment. Weight loss encouraged and health benefits explained to patient.     Essential hypertension  Well controlled, continue home medications and monitor blood pressure, adjust as needed.     Acute deep vein thrombosis (DVT) of left lower extremity  As above      VTE Risk Mitigation (From admission, onward)         Ordered     Reason for No Pharmacological VTE Prophylaxis  Once        Question:  Reasons:  Answer:  Already adequately anticoagulated on oral Anticoagulants    05/13/22 2540     IP VTE HIGH RISK  PATIENT  Once         05/13/22 2129     Place sequential compression device  Until discontinued         05/13/22 2129     enoxaparin injection 110 mg  Every 12 hours (non-standard times)         05/13/22 2129              As clarification, on 05/13/2022, patient should be placed in hospital observation services under my care in collaboration with MD Dominick Schreiber Jr., APRN, [facility]  Hospitalist - Department of Hospital Medicine  [facility] [address] [place] [address]  Office #: [phone]; Pager #: [phone]

## 2022-05-14 NOTE — ASSESSMENT & PLAN NOTE
Body mass index is 38.58 kg/m². Morbid obesity complicates all aspects of disease management from diagnostic modalities to treatment. Weight loss encouraged and health benefits explained to patient.

## 2022-05-14 NOTE — SUBJECTIVE & OBJECTIVE
Past Medical History:   Diagnosis Date    Hypertension        Past Surgical History:   Procedure Laterality Date    CHOLECYSTECTOMY         Review of patient's allergies indicates:  No Known Allergies    No current facility-administered medications on file prior to encounter.     Current Outpatient Medications on File Prior to Encounter   Medication Sig    propranoloL (INDERAL) 40 MG tablet Take 40 mg by mouth 2 (two) times daily.    hydrOXYzine HCl (ATARAX) 25 MG tablet Take 1 tablet (25 mg total) by mouth every 6 (six) hours.     Family History    None       Tobacco Use    Smoking status: Current Every Day Smoker     Packs/day: 1.50    Smokeless tobacco: Never Used   Substance and Sexual Activity    Alcohol use: No    Drug use: Never    Sexual activity: Not on file     Review of Systems   Constitutional:  Negative for chills and fever.   Eyes:  Negative for photophobia and visual disturbance.   Respiratory:  Negative for cough and shortness of breath.    Cardiovascular:  Positive for leg swelling. Negative for chest pain and palpitations.   Gastrointestinal:  Negative for abdominal pain, diarrhea, nausea and vomiting.   Genitourinary:  Negative for frequency, hematuria and urgency.   Musculoskeletal:  Positive for myalgias.   Skin:  Positive for color change. Negative for pallor, rash and wound.   Neurological:  Negative for light-headedness and headaches.   Psychiatric/Behavioral:  Negative for confusion and decreased concentration.    Objective:     Vital Signs (Most Recent):  Temp: 99.3 °F (37.4 °C) (05/13/22 1945)  Pulse: 105 (05/13/22 1945)  Resp: 18 (05/13/22 2050)  BP: 133/75 (05/13/22 1945)  SpO2: 96 % (05/13/22 1945) Vital Signs (24h Range):  Temp:  [98.9 °F (37.2 °C)-99.3 °F (37.4 °C)] 99.3 °F (37.4 °C)  Pulse:  [105-120] 105  Resp:  [16-20] 18  SpO2:  [96 %-100 %] 96 %  BP: (133-139)/(75-90) 133/75     Weight: 108.9 kg (240 lb)  Body mass index is 36.49 kg/m².    Physical Exam  Vitals and nursing note  reviewed.   Constitutional:       General: He is not in acute distress.     Appearance: He is well-developed.   HENT:      Head: Normocephalic and atraumatic.      Right Ear: External ear normal.      Left Ear: External ear normal.      Nose: Nose normal.   Eyes:      Extraocular Movements: EOM normal.      Conjunctiva/sclera: Conjunctivae normal.      Pupils: Pupils are equal, round, and reactive to light.   Cardiovascular:      Rate and Rhythm: Normal rate and regular rhythm.   Pulmonary:      Effort: Pulmonary effort is normal. No respiratory distress.      Breath sounds: Normal breath sounds. No wheezing or rales.   Abdominal:      General: Bowel sounds are normal. There is no distension.      Palpations: Abdomen is soft.      Tenderness: There is no abdominal tenderness.      Comments: No palpable hepatomegaly or splenomegaly   Musculoskeletal:         General: No tenderness. Normal range of motion.      Cervical back: Normal range of motion and neck supple.      Left lower leg: Edema present.   Skin:     General: Skin is warm and dry.      Findings: Erythema present.   Neurological:      Mental Status: He is alert and oriented to person, place, and time.   Psychiatric:         Thought Content: Thought content normal.         CRANIAL NERVES     CN III, IV, VI   Pupils are equal, round, and reactive to light.  Extraocular motions are normal.      Significant Labs: All pertinent labs within the past 24 hours have been reviewed.    Significant Imaging: I have reviewed all pertinent imaging results/findings within the past 24 hours.

## 2022-05-14 NOTE — ASSESSMENT & PLAN NOTE
Body mass index is 36.49 kg/m². Morbid obesity complicates all aspects of disease management from diagnostic modalities to treatment. Weight loss encouraged and health benefits explained to patient.

## 2022-05-14 NOTE — SUBJECTIVE & OBJECTIVE
Interval History: No acute events overnight.  No chest pain or shortness of breath.  Has significant pain in left leg, 7/10, which makes it impossible to take more than a few steps.  All questions answered and patient had no further complaints.    Review of Systems   Constitutional:  Negative for chills and fever.   HENT: Negative.     Eyes:  Negative for photophobia and visual disturbance.   Respiratory:  Negative for cough, chest tightness and shortness of breath.    Cardiovascular:  Positive for leg swelling. Negative for chest pain and palpitations.   Gastrointestinal:  Negative for abdominal pain, diarrhea, nausea and vomiting.   Genitourinary:  Negative for frequency, hematuria and urgency.   Musculoskeletal:  Positive for myalgias.   Skin:  Positive for color change. Negative for pallor, rash and wound.   Neurological:  Negative for light-headedness and headaches.   Psychiatric/Behavioral:  Negative for confusion and decreased concentration.    Objective:     Vital Signs (Most Recent):  Temp: 97.9 °F (36.6 °C) (05/14/22 0708)  Pulse: 97 (05/14/22 0910)  Resp: 17 (05/14/22 1135)  BP: (!) 118/55 (05/14/22 0910)  SpO2: 95 % (05/14/22 0708)   Vital Signs (24h Range):  Temp:  [97.9 °F (36.6 °C)-99.3 °F (37.4 °C)] 97.9 °F (36.6 °C)  Pulse:  [] 97  Resp:  [16-20] 17  SpO2:  [94 %-100 %] 95 %  BP: (118-140)/(55-90) 118/55     Weight: 115.1 kg (253 lb 11.2 oz)  Body mass index is 38.58 kg/m².    Intake/Output Summary (Last 24 hours) at 5/14/2022 1527  Last data filed at 5/13/2022 2149  Gross per 24 hour   Intake 1000 ml   Output --   Net 1000 ml      Physical Exam  Vitals and nursing note reviewed.   Constitutional:       General: He is not in acute distress.     Appearance: He is well-developed.   HENT:      Head: Normocephalic and atraumatic.      Right Ear: External ear normal.      Left Ear: External ear normal.      Nose: Nose normal.   Eyes:      Conjunctiva/sclera: Conjunctivae normal.      Pupils: Pupils  are equal, round, and reactive to light.   Cardiovascular:      Rate and Rhythm: Normal rate and regular rhythm.   Pulmonary:      Effort: Pulmonary effort is normal. No respiratory distress.      Breath sounds: Normal breath sounds. No wheezing or rales.   Abdominal:      General: Bowel sounds are normal. There is no distension.      Palpations: Abdomen is soft.      Tenderness: There is no abdominal tenderness.      Comments: No palpable hepatomegaly or splenomegaly   Musculoskeletal:         General: No tenderness. Normal range of motion.      Cervical back: Normal range of motion and neck supple.      Left lower leg: No edema.      Comments: Left leg is diffusely warm with slight diffuse erythema and TTP in calf and thigh.  No obvious swelling.     Skin:     General: Skin is warm and dry.      Findings: Erythema present.   Neurological:      Mental Status: He is alert and oriented to person, place, and time.   Psychiatric:         Thought Content: Thought content normal.       Significant Labs: All pertinent labs within the past 24 hours have been reviewed.    Significant Imaging: I have reviewed all pertinent imaging results/findings within the past 24 hours.

## 2022-05-14 NOTE — HPI
26 y.o. male with HTN, ADHD, tobacco use, obesity, and history of DVT as a child presents with a complaint of left lower ext pain.  Associated with some swelling, acute onset, described as similar to prior DVT, no known exacerbating or alleviating factors.  Denies fever, chills, cough, SOB, chest pain, palpitations, dizziness syncope, nausea,, diarrhea, abdominal pain complaining of black stools, dysuria.  In the ED he was found to have extensive DVT and bilateral PE.  Tachycardia noted.  Otherwise unremarkable for acute abnormality.  Started on full-dose enoxaparin.

## 2022-05-14 NOTE — PLAN OF CARE
SW attempted to complete a discharge assessment, but Pt was unavailable to speak. Will attempt again at a later time.    CHARISSE Aguirre, SRINIVASAN  [facility]  [id number]

## 2022-05-14 NOTE — PLAN OF CARE
Pt complained of pain and was given PRN med for pain. VS were stable. Call light within reach. Pt refused miralax, and stated he has not been eating much and that is why he is not using the restroom, he did eat better today. No adverse events occurred.     Problem: Adult Inpatient Plan of Care  Goal: Plan of Care Review  Outcome: Ongoing, Progressing  Goal: Patient-Specific Goal (Individualized)  Outcome: Ongoing, Progressing  Goal: Absence of Hospital-Acquired Illness or Injury  Outcome: Ongoing, Progressing  Goal: Optimal Comfort and Wellbeing  Outcome: Ongoing, Progressing  Goal: Readiness for Transition of Care  Outcome: Ongoing, Progressing     Problem: Infection  Goal: Absence of Infection Signs and Symptoms  Outcome: Ongoing, Progressing     Problem: Fall Injury Risk  Goal: Absence of Fall and Fall-Related Injury  Outcome: Ongoing, Progressing

## 2022-05-14 NOTE — PLAN OF CARE
"[facility] - Med Surg  Initial Discharge Assessment       Primary Care Provider: Corey Ulloa [facility] [address] [facility] [place] [id number] [phone]    Admission Diagnosis: Tachycardia [R00.0]  Swelling of left lower extremity [M79.89]  Chest pain [R07.9]  Acute deep vein thrombosis (DVT) of left lower extremity, unspecified vein [I82.402]  Acute pulmonary embolism, unspecified pulmonary embolism type, unspecified whether acute cor pulmonale present [I26.99]    Admission Date: 5/13/2022  Expected Discharge Date:     Discharge Barriers Identified: None    Payor: [employer] / Plan: [facility] CHOICE PLUS / Product Type: Commercial /     Extended Emergency Contact Information  Primary Emergency Contact: Alondra Chavarria  Address: [address] [place] [address] [place] [address] [facility]  Home Phone: [phone]  Relation: Spouse    Discharge Plan A: Home with family  Discharge Plan B: Home    Pharmacy: [facility] [address] [place] [address] [phone]    Initial Assessment (most recent)     Adult Discharge Assessment - 05/14/22 1533        Discharge Assessment    Assessment Type Discharge Planning Assessment     Confirmed/corrected address, phone number and insurance Yes     Confirmed Demographics Correct on Facesheet     Source of Information patient     When was your last doctors appointment? --   "In the last 6 mo"    Does patient/caregiver understand observation status Yes     Reason For Admission Acute deep vein thrombosis (DVT) of left lower extremity     Lives With spouse;other (see comments)   In laws    Facility Arrived From: Home     Do you expect to return to your current living situation? Yes     Do you have help at home or someone to help you manage your care at home? Yes     Who are your caregiver(s) and their phone number(s)? Wife Alondra Chavarria [phone]     Prior to hospitilization cognitive status: Alert/Oriented     Current cognitive status: Alert/Oriented     " Walking or Climbing Stairs Difficulty none     Dressing/Bathing Difficulty none     Home Accessibility wheelchair accessible     Home Layout Able to live on 1st floor     Equipment Currently Used at Home none     Readmission within 30 days? No     Patient currently being followed by outpatient case management? No     Do you currently have service(s) that help you manage your care at home? No     Do you take prescription medications? Yes     Do you have prescription coverage? Yes     Coverage [facility]     Do you have any problems affording any of your prescribed medications? No     Is the patient taking medications as prescribed? yes     Who is going to help you get home at discharge? Wife Alondra Chavarria [phone]     How do you get to doctors appointments? family or friend will provide;car, drives self     Are you on dialysis? No     Do you take coumadin? No     Discharge Plan A Home with family     Discharge Plan B Home     DME Needed Upon Discharge  none     Discharge Plan discussed with: Patient     Discharge Barriers Identified None        Relationship/Environment    Name(s) of Who Lives With Patient Wife Alondra Chavarria [phone]               Pt lives with his wife and in laws, Pt was independent prior to hospitalization. Pt is not on dialysis or Coumadin. Pt has never had home health.    Pt has transportation home.     SW will follow for any discharge needs.     CHARISSE Aguirre, NICKSW  [facility]  [id number]

## 2022-05-14 NOTE — ASSESSMENT & PLAN NOTE
-Placed in observation - submitting for review to convert to inpatient  -Doppler US of left leg showed thrombosis of all of the deep veins in the left lower extremity  -CTA chest showed bilateral acute pulmonary emboli with pulmonary trunk greater in caliber than the corresponding ascending thoracic aorta and subtle bilateral ground-glass opacities.  ?early pulmonary infarct?  -Troponin and BNP are normal.  -Etiology of VTE unclear.  Appears unprovoked.  No prolonged immobility or trauma.  Notes his father had history of blood clots.  -Order echocardiogram to assess for right heart strain  -Discussed with patient and Dr. Marshall.  As patient has significant pain in his leg and can only take a few steps he will need thrombectomy.  Planning for this on Monday.  -Continue lovenox and close monitoring for now.

## 2022-05-15 LAB
ANION GAP SERPL CALC-SCNC: 9 MMOL/L (ref 8–16)
AORTIC ROOT ANNULUS: 3.26 CM
AORTIC VALVE CUSP SEPERATION: 2.25 CM
ASCENDING AORTA: 3.19 CM
AV INDEX (PROSTH): 0.92
AV MEAN GRADIENT: 5 MMHG
AV PEAK GRADIENT: 9 MMHG
AV VALVE AREA: 3.67 CM2
AV VELOCITY RATIO: 0.84
BASOPHILS # BLD AUTO: 0.07 K/UL (ref 0–0.2)
BASOPHILS NFR BLD: 0.5 % (ref 0–1.9)
BSA FOR ECHO PROCEDURE: 2.35 M2
BUN SERPL-MCNC: 12 MG/DL (ref 6–20)
CALCIUM SERPL-MCNC: 9.2 MG/DL (ref 8.7–10.5)
CHLORIDE SERPL-SCNC: 101 MMOL/L (ref 95–110)
CK SERPL-CCNC: 86 U/L (ref 20–200)
CO2 SERPL-SCNC: 28 MMOL/L (ref 23–29)
CREAT SERPL-MCNC: 0.9 MG/DL (ref 0.5–1.4)
CV ECHO LV RWT: 0.49 CM
DIFFERENTIAL METHOD: ABNORMAL
DOP CALC AO PEAK VEL: 1.47 M/S
DOP CALC AO VTI: 23.83 CM
DOP CALC LVOT AREA: 4 CM2
DOP CALC LVOT DIAMETER: 2.25 CM
DOP CALC LVOT PEAK VEL: 1.23 M/S
DOP CALC LVOT STROKE VOLUME: 87.51 CM3
DOP CALCLVOT PEAK VEL VTI: 22.02 CM
E WAVE DECELERATION TIME: 171.84 MSEC
E/A RATIO: 1.69
E/E' RATIO: 4.75 M/S
ECHO LV POSTERIOR WALL: 1.15 CM (ref 0.6–1.1)
EJECTION FRACTION: 60 %
EOSINOPHIL # BLD AUTO: 0.1 K/UL (ref 0–0.5)
EOSINOPHIL NFR BLD: 0.7 % (ref 0–8)
ERYTHROCYTE [DISTWIDTH] IN BLOOD BY AUTOMATED COUNT: 12 % (ref 11.5–14.5)
EST. GFR  (AFRICAN AMERICAN): >60 ML/MIN/1.73 M^2
EST. GFR  (NON AFRICAN AMERICAN): >60 ML/MIN/1.73 M^2
FRACTIONAL SHORTENING: 30 % (ref 28–44)
GLUCOSE SERPL-MCNC: 127 MG/DL (ref 70–110)
HCT VFR BLD AUTO: 45 % (ref 40–54)
HGB BLD-MCNC: 14.6 G/DL (ref 14–18)
IMM GRANULOCYTES # BLD AUTO: 0.1 K/UL (ref 0–0.04)
IMM GRANULOCYTES NFR BLD AUTO: 0.7 % (ref 0–0.5)
INTERVENTRICULAR SEPTUM: 1.08 CM (ref 0.6–1.1)
LA MAJOR: 5.07 CM
LA MINOR: 4.77 CM
LA WIDTH: 3.48 CM
LACTATE SERPL-SCNC: 1 MMOL/L (ref 0.5–2.2)
LEFT ATRIUM SIZE: 3.6 CM
LEFT ATRIUM VOLUME INDEX: 23.2 ML/M2
LEFT ATRIUM VOLUME: 52.34 CM3
LEFT INTERNAL DIMENSION IN SYSTOLE: 3.29 CM (ref 2.1–4)
LEFT VENTRICLE DIASTOLIC VOLUME INDEX: 45.64 ML/M2
LEFT VENTRICLE DIASTOLIC VOLUME: 103.14 ML
LEFT VENTRICLE MASS INDEX: 85 G/M2
LEFT VENTRICLE SYSTOLIC VOLUME INDEX: 19.4 ML/M2
LEFT VENTRICLE SYSTOLIC VOLUME: 43.94 ML
LEFT VENTRICULAR INTERNAL DIMENSION IN DIASTOLE: 4.72 CM (ref 3.5–6)
LEFT VENTRICULAR MASS: 192.42 G
LV LATERAL E/E' RATIO: 4 M/S
LV SEPTAL E/E' RATIO: 5.85 M/S
LYMPHOCYTES # BLD AUTO: 2.7 K/UL (ref 1–4.8)
LYMPHOCYTES NFR BLD: 17.9 % (ref 18–48)
MCH RBC QN AUTO: 29.6 PG (ref 27–31)
MCHC RBC AUTO-ENTMCNC: 32.4 G/DL (ref 32–36)
MCV RBC AUTO: 91 FL (ref 82–98)
MONOCYTES # BLD AUTO: 1.7 K/UL (ref 0.3–1)
MONOCYTES NFR BLD: 11.1 % (ref 4–15)
MV PEAK A VEL: 0.45 M/S
MV PEAK E VEL: 0.76 M/S
MV STENOSIS PRESSURE HALF TIME: 49.83 MS
MV VALVE AREA P 1/2 METHOD: 4.42 CM2
NEUTROPHILS # BLD AUTO: 10.6 K/UL (ref 1.8–7.7)
NEUTROPHILS NFR BLD: 69.1 % (ref 38–73)
NRBC BLD-RTO: 0 /100 WBC
PISA TR MAX VEL: 2.5 M/S
PLATELET # BLD AUTO: 157 K/UL (ref 150–450)
PMV BLD AUTO: 10.2 FL (ref 9.2–12.9)
POTASSIUM SERPL-SCNC: 4.2 MMOL/L (ref 3.5–5.1)
PROCALCITONIN SERPL IA-MCNC: 0.16 NG/ML
PV PEAK VELOCITY: 0.99 CM/S
RA MAJOR: 4.9 CM
RA PRESSURE: 3 MMHG
RA WIDTH: 3.18 CM
RBC # BLD AUTO: 4.94 M/UL (ref 4.6–6.2)
RIGHT VENTRICULAR END-DIASTOLIC DIMENSION: 3.03 CM
RV TISSUE DOPPLER FREE WALL SYSTOLIC VELOCITY 1 (APICAL 4 CHAMBER VIEW): 16.31 CM/S
SODIUM SERPL-SCNC: 138 MMOL/L (ref 136–145)
STJ: 2.75 CM
TDI LATERAL: 0.19 M/S
TDI SEPTAL: 0.13 M/S
TDI: 0.16 M/S
TR MAX PG: 25 MMHG
TRICUSPID ANNULAR PLANE SYSTOLIC EXCURSION: 2.33 CM
TV REST PULMONARY ARTERY PRESSURE: 28 MMHG
WBC # BLD AUTO: 15.25 K/UL (ref 3.9–12.7)

## 2022-05-15 PROCEDURE — 63600175 PHARM REV CODE 636 W HCPCS: Performed by: HOSPITALIST

## 2022-05-15 PROCEDURE — 83605 ASSAY OF LACTIC ACID: CPT | Performed by: HOSPITALIST

## 2022-05-15 PROCEDURE — 82550 ASSAY OF CK (CPK): CPT | Performed by: HOSPITALIST

## 2022-05-15 PROCEDURE — 36415 COLL VENOUS BLD VENIPUNCTURE: CPT | Performed by: HOSPITALIST

## 2022-05-15 PROCEDURE — 80048 BASIC METABOLIC PNL TOTAL CA: CPT | Performed by: HOSPITALIST

## 2022-05-15 PROCEDURE — 11000001 HC ACUTE MED/SURG PRIVATE ROOM

## 2022-05-15 PROCEDURE — 94760 N-INVAS EAR/PLS OXIMETRY 1: CPT

## 2022-05-15 PROCEDURE — 85025 COMPLETE CBC W/AUTO DIFF WBC: CPT | Performed by: HOSPITALIST

## 2022-05-15 PROCEDURE — 25000003 PHARM REV CODE 250: Performed by: HOSPITALIST

## 2022-05-15 PROCEDURE — 84145 PROCALCITONIN (PCT): CPT | Performed by: HOSPITALIST

## 2022-05-15 RX ORDER — MORPHINE SULFATE 4 MG/ML
2 INJECTION, SOLUTION INTRAMUSCULAR; INTRAVENOUS EVERY 4 HOURS PRN
Status: DISCONTINUED | OUTPATIENT
Start: 2022-05-15 | End: 2022-05-17 | Stop reason: HOSPADM

## 2022-05-15 RX ADMIN — MORPHINE SULFATE 2 MG: 4 INJECTION INTRAVENOUS at 01:05

## 2022-05-15 RX ADMIN — MORPHINE SULFATE 2 MG: 4 INJECTION INTRAVENOUS at 05:05

## 2022-05-15 RX ADMIN — OXYCODONE AND ACETAMINOPHEN 1 TABLET: 5; 325 TABLET ORAL at 08:05

## 2022-05-15 RX ADMIN — OXYCODONE AND ACETAMINOPHEN 1 TABLET: 5; 325 TABLET ORAL at 10:05

## 2022-05-15 RX ADMIN — OXYCODONE AND ACETAMINOPHEN 1 TABLET: 5; 325 TABLET ORAL at 06:05

## 2022-05-15 RX ADMIN — OXYCODONE AND ACETAMINOPHEN 1 TABLET: 5; 325 TABLET ORAL at 03:05

## 2022-05-15 RX ADMIN — ENOXAPARIN SODIUM 105 MG: 120 INJECTION SUBCUTANEOUS at 09:05

## 2022-05-15 RX ADMIN — MORPHINE SULFATE 2 MG: 4 INJECTION INTRAVENOUS at 11:05

## 2022-05-15 RX ADMIN — PROPRANOLOL HYDROCHLORIDE 40 MG: 40 TABLET ORAL at 09:05

## 2022-05-15 RX ADMIN — OXYCODONE AND ACETAMINOPHEN 1 TABLET: 5; 325 TABLET ORAL at 01:05

## 2022-05-15 RX ADMIN — ONDANSETRON 4 MG: 2 INJECTION INTRAMUSCULAR; INTRAVENOUS at 04:05

## 2022-05-15 RX ADMIN — ENOXAPARIN SODIUM 105 MG: 120 INJECTION SUBCUTANEOUS at 08:05

## 2022-05-15 RX ADMIN — PROPRANOLOL HYDROCHLORIDE 40 MG: 40 TABLET ORAL at 08:05

## 2022-05-15 NOTE — SUBJECTIVE & OBJECTIVE
Interval History: No acute events overnight.  No chest pain or shortness of breath.  Pain in leg remains severe - 8/10 today and not well managed with oral pain meds.  All questions answered and patient had no further complaints.    Review of Systems   Constitutional:  Negative for chills and fever.   HENT: Negative.     Eyes:  Negative for photophobia and visual disturbance.   Respiratory:  Negative for cough, chest tightness and shortness of breath.    Cardiovascular:  Positive for leg swelling. Negative for chest pain and palpitations.   Gastrointestinal:  Negative for abdominal pain, diarrhea, nausea and vomiting.   Genitourinary:  Negative for frequency, hematuria and urgency.   Musculoskeletal:  Positive for myalgias.   Skin:  Positive for color change. Negative for pallor, rash and wound.   Neurological:  Negative for light-headedness and headaches.   Psychiatric/Behavioral:  Negative for confusion and decreased concentration.    Objective:     Vital Signs (Most Recent):  Temp: 98.3 °F (36.8 °C) (05/15/22 1210)  Pulse: 102 (05/15/22 1210)  Resp: 18 (05/15/22 1210)  BP: 131/75 (05/15/22 1210)  SpO2: 95 % (05/15/22 1210)   Vital Signs (24h Range):  Temp:  [98.3 °F (36.8 °C)-99.4 °F (37.4 °C)] 98.3 °F (36.8 °C)  Pulse:  [] 102  Resp:  [17-20] 18  SpO2:  [94 %-98 %] 95 %  BP: (128-132)/(75-85) 131/75     Weight: 115.1 kg (253 lb 11.2 oz)  Body mass index is 38.58 kg/m².    Intake/Output Summary (Last 24 hours) at 5/15/2022 1213  Last data filed at 5/15/2022 0600  Gross per 24 hour   Intake 120 ml   Output 800 ml   Net -680 ml        Physical Exam  Vitals and nursing note reviewed.   Constitutional:       General: He is not in acute distress.     Appearance: He is well-developed.   HENT:      Head: Normocephalic and atraumatic.      Right Ear: External ear normal.      Left Ear: External ear normal.      Nose: Nose normal.   Eyes:      Conjunctiva/sclera: Conjunctivae normal.      Pupils: Pupils are equal,  round, and reactive to light.   Cardiovascular:      Rate and Rhythm: Normal rate and regular rhythm.   Pulmonary:      Effort: Pulmonary effort is normal. No respiratory distress.      Breath sounds: Normal breath sounds. No wheezing or rales.   Abdominal:      General: Bowel sounds are normal. There is no distension.      Palpations: Abdomen is soft.      Tenderness: There is no abdominal tenderness.      Comments: No palpable hepatomegaly or splenomegaly   Musculoskeletal:         General: No tenderness. Normal range of motion.      Cervical back: Normal range of motion and neck supple.      Left lower leg: No edema.      Comments: Left leg is diffusely warm with slight diffuse erythema and TTP in calf and thigh.  No obvious swelling.     Skin:     General: Skin is warm and dry.      Findings: Erythema present.   Neurological:      Mental Status: He is alert and oriented to person, place, and time.   Psychiatric:         Thought Content: Thought content normal.       Significant Labs: All pertinent labs within the past 24 hours have been reviewed.    Significant Imaging: I have reviewed all pertinent imaging results/findings within the past 24 hours.

## 2022-05-15 NOTE — PROGRESS NOTES
[facility] Medicine  Progress Note    Patient Name: Colten Serrato  MRN: [id number]  Patient Class: IP- Inpatient   Admission Date: 5/13/2022  Length of Stay: 1 days  Attending Physician: Joey Chairez MD  Primary Care Provider: Provider [facility]        Subjective:     Principal Problem:Acute deep vein thrombosis (DVT) of left lower extremity        HPI:  26 y.o. male with HTN, ADHD, tobacco use, obesity, and history of DVT as a child presents with a complaint of left lower ext pain.  Associated with some swelling, acute onset, described as similar to prior DVT, no known exacerbating or alleviating factors.  Denies fever, chills, cough, SOB, chest pain, palpitations, dizziness syncope, nausea,, diarrhea, abdominal pain complaining of black stools, dysuria.  In the ED he was found to have extensive DVT and bilateral PE.  Tachycardia noted.  Otherwise unremarkable for acute abnormality.  Started on full-dose enoxaparin.      Overview/Hospital Course:  No notes on file    Interval History: No acute events overnight.  No chest pain or shortness of breath.  Pain in leg remains severe - 8/10 today and not well managed with oral pain meds.  All questions answered and patient had no further complaints.    Review of Systems   Constitutional:  Negative for chills and fever.   HENT: Negative.     Eyes:  Negative for photophobia and visual disturbance.   Respiratory:  Negative for cough, chest tightness and shortness of breath.    Cardiovascular:  Positive for leg swelling. Negative for chest pain and palpitations.   Gastrointestinal:  Negative for abdominal pain, diarrhea, nausea and vomiting.   Genitourinary:  Negative for frequency, hematuria and urgency.   Musculoskeletal:  Positive for myalgias.   Skin:  Positive for color change. Negative for pallor, rash and wound.   Neurological:  Negative for light-headedness and headaches.   Psychiatric/Behavioral:  Negative for confusion and decreased  concentration.    Objective:     Vital Signs (Most Recent):  Temp: 98.3 °F (36.8 °C) (05/15/22 1210)  Pulse: 102 (05/15/22 1210)  Resp: 18 (05/15/22 1210)  BP: 131/75 (05/15/22 1210)  SpO2: 95 % (05/15/22 1210)   Vital Signs (24h Range):  Temp:  [98.3 °F (36.8 °C)-99.4 °F (37.4 °C)] 98.3 °F (36.8 °C)  Pulse:  [] 102  Resp:  [17-20] 18  SpO2:  [94 %-98 %] 95 %  BP: (128-132)/(75-85) 131/75     Weight: 115.1 kg (253 lb 11.2 oz)  Body mass index is 38.58 kg/m².    Intake/Output Summary (Last 24 hours) at 5/15/2022 1213  Last data filed at 5/15/2022 0600  Gross per 24 hour   Intake 120 ml   Output 800 ml   Net -680 ml        Physical Exam  Vitals and nursing note reviewed.   Constitutional:       General: He is not in acute distress.     Appearance: He is well-developed.   HENT:      Head: Normocephalic and atraumatic.      Right Ear: External ear normal.      Left Ear: External ear normal.      Nose: Nose normal.   Eyes:      Conjunctiva/sclera: Conjunctivae normal.      Pupils: Pupils are equal, round, and reactive to light.   Cardiovascular:      Rate and Rhythm: Normal rate and regular rhythm.   Pulmonary:      Effort: Pulmonary effort is normal. No respiratory distress.      Breath sounds: Normal breath sounds. No wheezing or rales.   Abdominal:      General: Bowel sounds are normal. There is no distension.      Palpations: Abdomen is soft.      Tenderness: There is no abdominal tenderness.      Comments: No palpable hepatomegaly or splenomegaly   Musculoskeletal:         General: No tenderness. Normal range of motion.      Cervical back: Normal range of motion and neck supple.      Left lower leg: No edema.      Comments: Left leg is diffusely warm with slight diffuse erythema and TTP in calf and thigh.  No obvious swelling.     Skin:     General: Skin is warm and dry.      Findings: Erythema present.   Neurological:      Mental Status: He is alert and oriented to person, place, and time.   Psychiatric:          Thought Content: Thought content normal.       Significant Labs: All pertinent labs within the past 24 hours have been reviewed.    Significant Imaging: I have reviewed all pertinent imaging results/findings within the past 24 hours.      Assessment/Plan:      * Acute deep vein thrombosis (DVT) of left lower extremity  -Placed in observation - submitting for review to convert to inpatient  -Doppler US of left leg showed thrombosis of all of the deep veins in the left lower extremity  -CTA chest showed bilateral acute pulmonary emboli with pulmonary trunk greater in caliber than the corresponding ascending thoracic aorta and subtle bilateral ground-glass opacities.  ?early pulmonary infarct?  -Troponin and BNP are normal.  -Echo was normal without evidence of right heart strain.  -Etiology of VTE unclear.  Appears unprovoked.  No prolonged immobility or trauma.  Notes his father had history of blood clots.  -Will need outpatient workup for hypercoagulable states.  -Discussed with patient and Dr. Marshall.  As patient continues to have significant pain in his leg and can only take a few steps he will need thrombectomy.  Planning for this tomorrow.  NPO at [facility].  -Continue full dose lovenox and plan transition to eliquis after intervention tomorrow.  -Add iv morphine for pain not relieved by oral pain medications.    Bilateral pulmonary embolism  -Treatment as above.    Tobacco use  -Counselled on cessation 8 minutes.  -NRT ordered.    Class 2 severe obesity with serious comorbidity in adult  Body mass index is 38.58 kg/m². Morbid obesity complicates all aspects of disease management from diagnostic modalities to treatment. Weight loss encouraged and health benefits explained to patient.     Essential hypertension  -Well controlled  -Continue home propranolol.      VTE Risk Mitigation (From admission, onward)         Ordered     Reason for No Pharmacological VTE Prophylaxis  Once        Question:  Reasons:  Answer:   Already adequately anticoagulated on oral Anticoagulants    05/13/22 2129     IP VTE HIGH RISK PATIENT  Once         05/13/22 2129     Place sequential compression device  Until discontinued         05/13/22 2129     enoxaparin injection 105 mg  Every 12 hours (non-standard times)         05/13/22 2129                Discharge Planning   PAOLO:      Code Status: Full Code   Is the patient medically ready for discharge?:     Reason for patient still in hospital (select all that apply): Treatment  Discharge Plan A: Home with family                  Joey Chairez MD  Department of Hospital Medicine   [facility]

## 2022-05-15 NOTE — PLAN OF CARE
Pt alert able to make needs known, tolerates medications well by mouth, no signs or symptoms of adverse reactions noted, repositioned self q 2hrs, pain controlled by PRN pain medication and breakthrough medication, plan of care explained, diet tolerated, remains free from falls and hospital acquired pressure injuries, safety maintained. Will continue following plan of care.     Problem: Adult Inpatient Plan of Care  Goal: Plan of Care Review  Outcome: Ongoing, Progressing  Goal: Patient-Specific Goal (Individualized)  Outcome: Ongoing, Progressing  Goal: Absence of Hospital-Acquired Illness or Injury  Outcome: Ongoing, Progressing  Goal: Optimal Comfort and Wellbeing  Outcome: Ongoing, Progressing  Goal: Readiness for Transition of Care  Outcome: Ongoing, Progressing     Problem: Infection  Goal: Absence of Infection Signs and Symptoms  Outcome: Ongoing, Progressing     Problem: Fall Injury Risk  Goal: Absence of Fall and Fall-Related Injury  Outcome: Ongoing, Progressing

## 2022-05-15 NOTE — HOSPITAL COURSE
26 year old man with history of hypertension, ADHD, obesity, tobacco use, upper extremity DVT as a baby who presented for evaluation of left leg pain.  He was diagnosed with extensive DVT in left leg and bilateral pulmonary emboli.  He has no chest pain or shortness of breath.  Echo does not show any evidence of right heart strain.  He has severe pain in his left leg requiring iv morphine.  Dr Marshall consulted for thrombectomy on 5/16. Of note this is an unprovoked DVT, his father had blood clots and he had an upper extremity DVT as a child.  He will need close follow up with hematology oncology for hypercoagulable workup.  Patient underwent successful mechanical DVT thrombectomy with INARI clottriever was performed of the left common iliac, left external iliac, left common femoral, left femoral  veins with extirpition of matter on 5/16.  Patient has significant clot burden and still having significant LLE pain post procedure.  Initially is was severe pain at rest, now only upon standing up and applying pressure to leg.  Discussed with Dr. Marshall and pain will probably take several days to improve secondary to significant clot burden.  He has remained afebrile and hemodynamically stable.  Because of signifcant clot, Dr. Marshall recommending one week of therapeutic Lovenox and then transitiioning to Eliquis.  Prescriptions sent to pharmacy.  He continues with no chest pain, dyspnea or hypoxia.  Patient will be discharged home to follow up [facility] Dr. Marshall and Heme/Onc.

## 2022-05-15 NOTE — NURSING
Scheduled medications given. Complaint of left leg pain; prn percocet given with no relief. Pt was up in chair but currently back in bed using walker. Pt unable to bare weight on left leg. Pt free from fall/injury. IV saline lock. Tele #[phone]. Safety measures maintained. Call light within reach. Bed alarm set. Will cont to monitor

## 2022-05-15 NOTE — ASSESSMENT & PLAN NOTE
-Placed in observation - submitting for review to convert to inpatient  -Doppler US of left leg showed thrombosis of all of the deep veins in the left lower extremity  -CTA chest showed bilateral acute pulmonary emboli with pulmonary trunk greater in caliber than the corresponding ascending thoracic aorta and subtle bilateral ground-glass opacities.  ?early pulmonary infarct?  -Troponin and BNP are normal.  -Echo was normal without evidence of right heart strain.  -Etiology of VTE unclear.  Appears unprovoked.  No prolonged immobility or trauma.  Notes his father had history of blood clots.  -Will need outpatient workup for hypercoagulable states.  -Discussed with patient and Dr. Marshall.  As patient continues to have significant pain in his leg and can only take a few steps he will need thrombectomy.  Planning for this tomorrow.  NPO at [place].  -Continue full dose lovenox and plan transition to eliquis after intervention tomorrow.  -Add iv morphine for pain not relieved by oral pain medications.

## 2022-05-16 LAB
ANION GAP SERPL CALC-SCNC: 9 MMOL/L (ref 8–16)
BASOPHILS # BLD AUTO: 0.09 K/UL (ref 0–0.2)
BASOPHILS NFR BLD: 0.6 % (ref 0–1.9)
BUN SERPL-MCNC: 14 MG/DL (ref 6–20)
CALCIUM SERPL-MCNC: 9.4 MG/DL (ref 8.7–10.5)
CHLORIDE SERPL-SCNC: 101 MMOL/L (ref 95–110)
CO2 SERPL-SCNC: 29 MMOL/L (ref 23–29)
CREAT SERPL-MCNC: 1 MG/DL (ref 0.5–1.4)
DIFFERENTIAL METHOD: ABNORMAL
EOSINOPHIL # BLD AUTO: 0.2 K/UL (ref 0–0.5)
EOSINOPHIL NFR BLD: 1.2 % (ref 0–8)
ERYTHROCYTE [DISTWIDTH] IN BLOOD BY AUTOMATED COUNT: 12 % (ref 11.5–14.5)
EST. GFR  (AFRICAN AMERICAN): >60 ML/MIN/1.73 M^2
EST. GFR  (NON AFRICAN AMERICAN): >60 ML/MIN/1.73 M^2
GLUCOSE SERPL-MCNC: 93 MG/DL (ref 70–110)
HCT VFR BLD AUTO: 43.6 % (ref 40–54)
HGB BLD-MCNC: 13.9 G/DL (ref 14–18)
IMM GRANULOCYTES # BLD AUTO: 0.12 K/UL (ref 0–0.04)
IMM GRANULOCYTES NFR BLD AUTO: 0.8 % (ref 0–0.5)
LYMPHOCYTES # BLD AUTO: 3.5 K/UL (ref 1–4.8)
LYMPHOCYTES NFR BLD: 22.3 % (ref 18–48)
MCH RBC QN AUTO: 28.5 PG (ref 27–31)
MCHC RBC AUTO-ENTMCNC: 31.9 G/DL (ref 32–36)
MCV RBC AUTO: 90 FL (ref 82–98)
MONOCYTES # BLD AUTO: 1.9 K/UL (ref 0.3–1)
MONOCYTES NFR BLD: 12 % (ref 4–15)
NEUTROPHILS # BLD AUTO: 9.9 K/UL (ref 1.8–7.7)
NEUTROPHILS NFR BLD: 63.1 % (ref 38–73)
NRBC BLD-RTO: 0 /100 WBC
PLATELET # BLD AUTO: 184 K/UL (ref 150–450)
PMV BLD AUTO: 10.3 FL (ref 9.2–12.9)
POCT GLUCOSE: 112 MG/DL (ref 70–110)
POTASSIUM SERPL-SCNC: 4.4 MMOL/L (ref 3.5–5.1)
RBC # BLD AUTO: 4.87 M/UL (ref 4.6–6.2)
SODIUM SERPL-SCNC: 139 MMOL/L (ref 136–145)
WBC # BLD AUTO: 15.67 K/UL (ref 3.9–12.7)

## 2022-05-16 PROCEDURE — 99223 1ST HOSP IP/OBS HIGH 75: CPT | Mod: ,,, | Performed by: INTERNAL MEDICINE

## 2022-05-16 PROCEDURE — 37187 PR THROMBECTOMY, VENOUS: ICD-10-PCS | Mod: LT,,, | Performed by: INTERNAL MEDICINE

## 2022-05-16 PROCEDURE — 36012 PLACE CATHETER IN VEIN: CPT | Mod: 59,LT,, | Performed by: INTERNAL MEDICINE

## 2022-05-16 PROCEDURE — C1753 CATH, INTRAVAS ULTRASOUND: HCPCS | Performed by: INTERNAL MEDICINE

## 2022-05-16 PROCEDURE — 37187 VENOUS MECH THROMBECTOMY: CPT | Mod: LT,,, | Performed by: INTERNAL MEDICINE

## 2022-05-16 PROCEDURE — 37249 TRLUML BALO ANGIOP ADDL VEIN: CPT | Mod: LT | Performed by: INTERNAL MEDICINE

## 2022-05-16 PROCEDURE — 36012 PLACE CATHETER IN VEIN: CPT | Mod: 59,LT | Performed by: INTERNAL MEDICINE

## 2022-05-16 PROCEDURE — 37248 TRLUML BALO ANGIOP 1ST VEIN: CPT | Mod: LT | Performed by: INTERNAL MEDICINE

## 2022-05-16 PROCEDURE — 37253 INTRVASC US NONCORONARY ADDL: CPT | Mod: LT | Performed by: INTERNAL MEDICINE

## 2022-05-16 PROCEDURE — 63600175 PHARM REV CODE 636 W HCPCS: Performed by: INTERNAL MEDICINE

## 2022-05-16 PROCEDURE — C1725 CATH, TRANSLUMIN NON-LASER: HCPCS | Performed by: INTERNAL MEDICINE

## 2022-05-16 PROCEDURE — C1757 CATH, THROMBECTOMY/EMBOLECT: HCPCS | Performed by: INTERNAL MEDICINE

## 2022-05-16 PROCEDURE — 63600175 PHARM REV CODE 636 W HCPCS: Performed by: HOSPITALIST

## 2022-05-16 PROCEDURE — 25500020 PHARM REV CODE 255: Performed by: INTERNAL MEDICINE

## 2022-05-16 PROCEDURE — 37253 PR INTRVASC US NON CORONARY, EACH ADDL VESSEL, S&I, ADD ON CODE: ICD-10-PCS | Mod: LT,,, | Performed by: INTERNAL MEDICINE

## 2022-05-16 PROCEDURE — 99152 MOD SED SAME PHYS/QHP 5/>YRS: CPT | Mod: ,,, | Performed by: INTERNAL MEDICINE

## 2022-05-16 PROCEDURE — 75820 VEIN X-RAY ARM/LEG: CPT | Mod: 59,LT | Performed by: INTERNAL MEDICINE

## 2022-05-16 PROCEDURE — 75820 VEIN X-RAY ARM/LEG: CPT | Mod: 26,59,LT, | Performed by: INTERNAL MEDICINE

## 2022-05-16 PROCEDURE — 75820 PR VENOGRAM EXTREMITY UNILAT: ICD-10-PCS | Mod: 26,59,LT, | Performed by: INTERNAL MEDICINE

## 2022-05-16 PROCEDURE — 99900035 HC TECH TIME PER 15 MIN (STAT)

## 2022-05-16 PROCEDURE — 11000001 HC ACUTE MED/SURG PRIVATE ROOM

## 2022-05-16 PROCEDURE — 25000003 PHARM REV CODE 250: Performed by: INTERNAL MEDICINE

## 2022-05-16 PROCEDURE — C1769 GUIDE WIRE: HCPCS | Performed by: INTERNAL MEDICINE

## 2022-05-16 PROCEDURE — 37252 INTRVASC US NONCORONARY 1ST: CPT | Mod: LT,,, | Performed by: INTERNAL MEDICINE

## 2022-05-16 PROCEDURE — 99153 MOD SED SAME PHYS/QHP EA: CPT | Performed by: INTERNAL MEDICINE

## 2022-05-16 PROCEDURE — 37248 TRLUML BALO ANGIOP 1ST VEIN: CPT | Mod: LT,,, | Performed by: INTERNAL MEDICINE

## 2022-05-16 PROCEDURE — 37253 INTRVASC US NONCORONARY ADDL: CPT | Mod: LT,,, | Performed by: INTERNAL MEDICINE

## 2022-05-16 PROCEDURE — 99152 MOD SED SAME PHYS/QHP 5/>YRS: CPT | Performed by: INTERNAL MEDICINE

## 2022-05-16 PROCEDURE — 37187 VENOUS MECH THROMBECTOMY: CPT | Mod: LT | Performed by: INTERNAL MEDICINE

## 2022-05-16 PROCEDURE — 76937 US GUIDE VASCULAR ACCESS: CPT | Performed by: INTERNAL MEDICINE

## 2022-05-16 PROCEDURE — 80048 BASIC METABOLIC PNL TOTAL CA: CPT | Performed by: HOSPITALIST

## 2022-05-16 PROCEDURE — 36012 PR PLACE CATH IN VEIN,SUBSELECT: ICD-10-PCS | Mod: 59,LT,, | Performed by: INTERNAL MEDICINE

## 2022-05-16 PROCEDURE — 94761 N-INVAS EAR/PLS OXIMETRY MLT: CPT

## 2022-05-16 PROCEDURE — 36415 COLL VENOUS BLD VENIPUNCTURE: CPT | Performed by: HOSPITALIST

## 2022-05-16 PROCEDURE — 37249 TRLUML BALO ANGIOP ADDL VEIN: CPT | Mod: LT,,, | Performed by: INTERNAL MEDICINE

## 2022-05-16 PROCEDURE — 37249 PR TRANSLML BALLOON ANGIO, OPEN/PERC, EA ADDTL VEIN: ICD-10-PCS | Mod: LT,,, | Performed by: INTERNAL MEDICINE

## 2022-05-16 PROCEDURE — 25000003 PHARM REV CODE 250: Performed by: HOSPITALIST

## 2022-05-16 PROCEDURE — 37252 PR IVUS, NON-CORONARY, 1ST VESSEL: ICD-10-PCS | Mod: LT,,, | Performed by: INTERNAL MEDICINE

## 2022-05-16 PROCEDURE — 37252 INTRVASC US NONCORONARY 1ST: CPT | Mod: LT | Performed by: INTERNAL MEDICINE

## 2022-05-16 PROCEDURE — 99152 PR MOD CONSCIOUS SEDATION, SAME PHYS, 5+ YRS, FIRST 15 MIN: ICD-10-PCS | Mod: ,,, | Performed by: INTERNAL MEDICINE

## 2022-05-16 PROCEDURE — 99223 PR INITIAL HOSPITAL CARE,LEVL III: ICD-10-PCS | Mod: ,,, | Performed by: INTERNAL MEDICINE

## 2022-05-16 PROCEDURE — C1894 INTRO/SHEATH, NON-LASER: HCPCS | Performed by: INTERNAL MEDICINE

## 2022-05-16 PROCEDURE — 85025 COMPLETE CBC W/AUTO DIFF WBC: CPT | Performed by: HOSPITALIST

## 2022-05-16 PROCEDURE — 37248 PR TRANSLML BALLOON ANGIO, OPEN/PERC, INITIAL VEIN: ICD-10-PCS | Mod: LT,,, | Performed by: INTERNAL MEDICINE

## 2022-05-16 RX ORDER — CLOPIDOGREL 300 MG/1
600 TABLET, FILM COATED ORAL ONCE
Status: DISCONTINUED | OUTPATIENT
Start: 2022-05-16 | End: 2022-05-17 | Stop reason: HOSPADM

## 2022-05-16 RX ORDER — DIPHENHYDRAMINE HCL 25 MG
50 CAPSULE ORAL ONCE
Status: COMPLETED | OUTPATIENT
Start: 2022-05-16 | End: 2022-05-16

## 2022-05-16 RX ORDER — CLOPIDOGREL 300 MG/1
TABLET, FILM COATED ORAL
Status: DISCONTINUED | OUTPATIENT
Start: 2022-05-16 | End: 2022-05-16 | Stop reason: HOSPADM

## 2022-05-16 RX ORDER — LIDOCAINE HYDROCHLORIDE 10 MG/ML
INJECTION INFILTRATION; PERINEURAL
Status: DISCONTINUED | OUTPATIENT
Start: 2022-05-16 | End: 2022-05-16 | Stop reason: HOSPADM

## 2022-05-16 RX ORDER — FENTANYL CITRATE 50 UG/ML
INJECTION, SOLUTION INTRAMUSCULAR; INTRAVENOUS
Status: DISCONTINUED | OUTPATIENT
Start: 2022-05-16 | End: 2022-05-16 | Stop reason: HOSPADM

## 2022-05-16 RX ORDER — ONDANSETRON 2 MG/ML
4 INJECTION INTRAMUSCULAR; INTRAVENOUS EVERY 12 HOURS PRN
Status: DISCONTINUED | OUTPATIENT
Start: 2022-05-16 | End: 2022-05-17 | Stop reason: HOSPADM

## 2022-05-16 RX ORDER — SODIUM CHLORIDE 9 MG/ML
INJECTION, SOLUTION INTRAVENOUS CONTINUOUS
Status: CANCELLED | OUTPATIENT
Start: 2022-05-16

## 2022-05-16 RX ORDER — HEPARIN SODIUM 1000 [USP'U]/ML
INJECTION, SOLUTION INTRAVENOUS; SUBCUTANEOUS
Status: DISCONTINUED | OUTPATIENT
Start: 2022-05-16 | End: 2022-05-16 | Stop reason: HOSPADM

## 2022-05-16 RX ORDER — SODIUM CHLORIDE 9 MG/ML
INJECTION, SOLUTION INTRAVENOUS CONTINUOUS
Status: DISCONTINUED | OUTPATIENT
Start: 2022-05-16 | End: 2022-05-17 | Stop reason: HOSPADM

## 2022-05-16 RX ORDER — MORPHINE SULFATE 1 MG/ML
INJECTION, SOLUTION EPIDURAL; INTRATHECAL; INTRAVENOUS
Status: DISCONTINUED | OUTPATIENT
Start: 2022-05-16 | End: 2022-05-16 | Stop reason: HOSPADM

## 2022-05-16 RX ORDER — OXYCODONE HYDROCHLORIDE 5 MG/1
5 TABLET ORAL EVERY 4 HOURS PRN
Status: DISCONTINUED | OUTPATIENT
Start: 2022-05-16 | End: 2022-05-17 | Stop reason: HOSPADM

## 2022-05-16 RX ORDER — ACETAMINOPHEN 325 MG/1
650 TABLET ORAL EVERY 4 HOURS PRN
Status: DISCONTINUED | OUTPATIENT
Start: 2022-05-16 | End: 2022-05-17 | Stop reason: HOSPADM

## 2022-05-16 RX ORDER — MORPHINE SULFATE 4 MG/ML
3 INJECTION, SOLUTION INTRAMUSCULAR; INTRAVENOUS
Status: DISCONTINUED | OUTPATIENT
Start: 2022-05-16 | End: 2022-05-17 | Stop reason: HOSPADM

## 2022-05-16 RX ORDER — SODIUM CHLORIDE 9 MG/ML
INJECTION, SOLUTION INTRAVENOUS CONTINUOUS
Status: DISCONTINUED | OUTPATIENT
Start: 2022-05-16 | End: 2022-05-16

## 2022-05-16 RX ORDER — ASPIRIN 325 MG
325 TABLET ORAL DAILY
Status: DISCONTINUED | OUTPATIENT
Start: 2022-05-16 | End: 2022-05-16

## 2022-05-16 RX ORDER — MIDAZOLAM HYDROCHLORIDE 1 MG/ML
INJECTION, SOLUTION INTRAMUSCULAR; INTRAVENOUS
Status: DISCONTINUED | OUTPATIENT
Start: 2022-05-16 | End: 2022-05-16 | Stop reason: HOSPADM

## 2022-05-16 RX ADMIN — OXYCODONE AND ACETAMINOPHEN 1 TABLET: 5; 325 TABLET ORAL at 12:05

## 2022-05-16 RX ADMIN — PROPRANOLOL HYDROCHLORIDE 40 MG: 40 TABLET ORAL at 08:05

## 2022-05-16 RX ADMIN — ENOXAPARIN SODIUM 105 MG: 120 INJECTION SUBCUTANEOUS at 08:05

## 2022-05-16 RX ADMIN — PROPRANOLOL HYDROCHLORIDE 40 MG: 40 TABLET ORAL at 09:05

## 2022-05-16 RX ADMIN — OXYCODONE AND ACETAMINOPHEN 1 TABLET: 5; 325 TABLET ORAL at 08:05

## 2022-05-16 RX ADMIN — MORPHINE SULFATE 2 MG: 4 INJECTION INTRAVENOUS at 04:05

## 2022-05-16 RX ADMIN — DIPHENHYDRAMINE HYDROCHLORIDE 50 MG: 25 CAPSULE ORAL at 01:05

## 2022-05-16 RX ADMIN — SODIUM CHLORIDE: 0.9 INJECTION, SOLUTION INTRAVENOUS at 08:05

## 2022-05-16 RX ADMIN — ASPIRIN 325 MG ORAL TABLET 325 MG: 325 PILL ORAL at 02:05

## 2022-05-16 RX ADMIN — SODIUM CHLORIDE: 0.9 INJECTION, SOLUTION INTRAVENOUS at 11:05

## 2022-05-16 RX ADMIN — MORPHINE SULFATE 3 MG: 4 INJECTION INTRAVENOUS at 11:05

## 2022-05-16 NOTE — PLAN OF CARE
Pt alert able to make needs known, tolerates medications well by mouth, no signs or symptoms of adverse reactions noted, repositioned self q 2hrs, pain controlled by PRN pain medication, pt had thrombectomy today, plan of care explained, diet tolerated, remains free from falls and hospital acquired pressure injuries, safety maintained. Will continue following plan of care.   Problem: Adult Inpatient Plan of Care  Goal: Plan of Care Review  Outcome: Ongoing, Progressing  Goal: Patient-Specific Goal (Individualized)  Outcome: Ongoing, Progressing  Goal: Absence of Hospital-Acquired Illness or Injury  Outcome: Ongoing, Progressing  Goal: Optimal Comfort and Wellbeing  Outcome: Ongoing, Progressing  Goal: Readiness for Transition of Care  Outcome: Ongoing, Progressing     Problem: Infection  Goal: Absence of Infection Signs and Symptoms  Outcome: Ongoing, Progressing     Problem: Fall Injury Risk  Goal: Absence of Fall and Fall-Related Injury  Outcome: Ongoing, Progressing

## 2022-05-16 NOTE — ASSESSMENT & PLAN NOTE
Symptomatic DVT of left lower leg.  Discussed various options.  Because of severity of pain, will take patient for DVT thrombectomy today.      Risks, benefits and alternatives of the  dvt thrombectomy procedure were discussed with the patient.The risks of thrombectomy include but are not limited to: bleeding, infection, death, heart attack, arrhythmia, kidney injury or failure, potential need for dialysis, allergic reactions, stroke, need for emergency surgery, hematoma, pseudoaneurysm, injury to  blood vessels etc.   The risks of moderate sedation include hypotension, respiratory depression, arrhythmias, bronchospasm, and death. Informed consent was obtained and the  patient is agreeable to proceed with the procedure. Consent was placed on the chart.

## 2022-05-16 NOTE — SUBJECTIVE & OBJECTIVE
Past Medical History:   Diagnosis Date    Hypertension        Past Surgical History:   Procedure Laterality Date    CHOLECYSTECTOMY         Review of patient's allergies indicates:  No Known Allergies    No current facility-administered medications on file prior to encounter.     Current Outpatient Medications on File Prior to Encounter   Medication Sig    propranoloL (INDERAL) 40 MG tablet Take 40 mg by mouth 2 (two) times daily.    hydrOXYzine HCl (ATARAX) 25 MG tablet Take 1 tablet (25 mg total) by mouth every 6 (six) hours.     Family History    None       Tobacco Use    Smoking status: Current Every Day Smoker     Packs/day: 1.50    Smokeless tobacco: Never Used   Substance and Sexual Activity    Alcohol use: No    Drug use: Never    Sexual activity: Not on file     Review of Systems   Constitutional: Negative.   HENT: Negative.     Eyes: Negative.    Cardiovascular:  Positive for leg swelling.   Respiratory: Negative.     Endocrine: Negative.    Hematologic/Lymphatic: Negative.    Skin: Negative.    Musculoskeletal: Negative.    Gastrointestinal: Negative.    Genitourinary: Negative.    Neurological: Negative.    Psychiatric/Behavioral: Negative.     Allergic/Immunologic: Negative.    Objective:     Vital Signs (Most Recent):  Temp: 98.8 °F (37.1 °C) (05/16/22 0716)  Pulse: 97 (05/16/22 0716)  Resp: 18 (05/16/22 0848)  BP: 135/71 (05/16/22 0849)  SpO2: 97 % (05/16/22 0716) Vital Signs (24h Range):  Temp:  [97.9 °F (36.6 °C)-99.9 °F (37.7 °C)] 98.8 °F (37.1 °C)  Pulse:  [] 97  Resp:  [16-20] 18  SpO2:  [94 %-99 %] 97 %  BP: (126-144)/(71-83) 135/71     Weight: 115.1 kg (253 lb 11.2 oz)  Body mass index is 38.58 kg/m².    SpO2: 97 %  O2 Device (Oxygen Therapy): room air      Intake/Output Summary (Last 24 hours) at 5/16/2022 1030  Last data filed at 5/16/2022 0500  Gross per 24 hour   Intake 480 ml   Output 1805 ml   Net -1325 ml       Lines/Drains/Airways       Peripheral Intravenous Line  Duration                   Peripheral IV - Single Lumen 05/13/22 1818 20 G Right Antecubital 2 days                    Physical Exam  Vitals reviewed.   Constitutional:       Appearance: He is well-developed.   HENT:      Head: Normocephalic.   Eyes:      Conjunctiva/sclera: Conjunctivae normal.      Pupils: Pupils are equal, round, and reactive to light.   Cardiovascular:      Rate and Rhythm: Normal rate and regular rhythm.      Heart sounds: Normal heart sounds.   Pulmonary:      Effort: Pulmonary effort is normal.      Breath sounds: Normal breath sounds.   Abdominal:      General: Bowel sounds are normal.      Palpations: Abdomen is soft.   Musculoskeletal:      Cervical back: Normal range of motion and neck supple.   Skin:     General: Skin is warm.   Neurological:      Mental Status: He is alert and oriented to person, place, and time.       Significant Labs: BMP:   Recent Labs   Lab 05/15/22  0528 05/16/22  0347   * 93    139   K 4.2 4.4    101   CO2 28 29   BUN 12 14   CREATININE 0.9 1.0   CALCIUM 9.2 9.4   , CMP   Recent Labs   Lab 05/15/22  0528 05/16/22  0347    139   K 4.2 4.4    101   CO2 28 29   * 93   BUN 12 14   CREATININE 0.9 1.0   CALCIUM 9.2 9.4   ANIONGAP 9 9   ESTGFRAFRICA >60 >60   EGFRNONAA >60 >60   , CBC   Recent Labs   Lab 05/14/22  1059 05/15/22  0528 05/16/22  0347   WBC 14.48* 15.25* 15.67*   HGB 14.1 14.6 13.9*   HCT 44.1 45.0 43.6    157 184   , INR No results for input(s): INR, PROTIME in the last 48 hours., Lipid Panel No results for input(s): CHOL, HDL, LDLCALC, TRIG, CHOLHDL in the last 48 hours., Troponin No results for input(s): TROPONINI in the last 48 hours., and All pertinent lab results from the last 24 hours have been reviewed.    Significant Imaging: Echocardiogram: Transthoracic echo (TTE) complete (Cupid Only):   Results for orders placed or performed during the hospital encounter of 05/13/22   Echo   Result Value Ref Range    BSA 2.35 m2     TDI SEPTAL 0.13 m/s    LV LATERAL E/E' RATIO 4.00 m/s    LV SEPTAL E/E' RATIO 5.85 m/s    LA WIDTH 3.48 cm    AORTIC VALVE CUSP SEPERATION 2.25 cm    TDI LATERAL 0.19 m/s    PV PEAK VELOCITY 0.99 cm/s    LVIDd 4.72 3.5 - 6.0 cm    IVS 1.08 0.6 - 1.1 cm    Posterior Wall 1.15 (A) 0.6 - 1.1 cm    Ao root annulus 3.26 cm    LVIDs 3.29 2.1 - 4.0 cm    FS 30 28 - 44 %    LA volume 52.34 cm3    STJ 2.75 cm    Ascending aorta 3.19 cm    LV mass 192.42 g    LA size 3.60 cm    RVDD 3.03 cm    TAPSE 2.33 cm    RV S' 16.31 cm/s    Left Ventricle Relative Wall Thickness 0.49 cm    AV mean gradient 5 mmHg    AV valve area 3.67 cm2    AV Velocity Ratio 0.84     AV index (prosthetic) 0.92     MV valve area p 1/2 method 4.42 cm2    E/A ratio 1.69     Mean e' 0.16 m/s    E wave deceleration time 171.84 msec    LVOT diameter 2.25 cm    LVOT area 4.0 cm2    LVOT peak anthony 1.23 m/s    LVOT peak VTI 22.02 cm    Ao peak anthony 1.47 m/s    Ao VTI 23.83 cm    LVOT stroke volume 87.51 cm3    AV peak gradient 9 mmHg    E/E' ratio 4.75 m/s    MV Peak E Anthony 0.76 m/s    TR Max Anthony 2.50 m/s    MV stenosis pressure 1/2 time 49.83 ms    MV Peak A Anthony 0.45 m/s    LV Systolic Volume 43.94 mL    LV Systolic Volume Index 19.4 mL/m2    LV Diastolic Volume 103.14 mL    LV Diastolic Volume Index 45.64 mL/m2    LA Volume Index 23.2 mL/m2    LV Mass Index 85 g/m2    RA Major Axis 4.90 cm    Left Atrium Minor Axis 4.77 cm    Left Atrium Major Axis 5.07 cm    Triscuspid Valve Regurgitation Peak Gradient 25 mmHg    RA Width 3.18 cm    Right Atrial Pressure (from IVC) 3 mmHg    EF 60 %    TV rest pulmonary artery pressure 28 mmHg    Narrative    · The left ventricle is normal in size with normal systolic function.  · The estimated ejection fraction is 60%.  · Normal left ventricular diastolic function.  · Normal right ventricular size with normal right ventricular systolic   function.  · Normal central venous pressure (3 mmHg).  · The estimated PA systolic  pressure is 28 mmHg.

## 2022-05-16 NOTE — SUBJECTIVE & OBJECTIVE
Interval History: Complains of thirst this morning. No other complaints. Understands plan for thrombectomy today.     Review of Systems   Constitutional:  Negative for fever.   Respiratory:  Negative for cough, chest tightness and shortness of breath.    Cardiovascular:  Positive for leg swelling. Negative for chest pain and palpitations.   Gastrointestinal:  Negative for abdominal pain, constipation, diarrhea, nausea and vomiting.   Objective:     Vital Signs (Most Recent):  Temp: 98.8 °F (37.1 °C) (05/16/22 0716)  Pulse: 97 (05/16/22 0716)  Resp: 18 (05/16/22 0848)  BP: 135/71 (05/16/22 0849)  SpO2: 97 % (05/16/22 0716) Vital Signs (24h Range):  Temp:  [97.9 °F (36.6 °C)-99.9 °F (37.7 °C)] 98.8 °F (37.1 °C)  Pulse:  [] 97  Resp:  [16-20] 18  SpO2:  [94 %-99 %] 97 %  BP: (126-144)/(71-83) 135/71     Weight: 115.1 kg (253 lb 11.2 oz)  Body mass index is 38.58 kg/m².    Intake/Output Summary (Last 24 hours) at 5/16/2022 1051  Last data filed at 5/16/2022 0500  Gross per 24 hour   Intake 480 ml   Output 1805 ml   Net -1325 ml      Physical Exam  Vitals and nursing note reviewed.   Constitutional:       General: He is not in acute distress.     Appearance: He is obese. He is not ill-appearing or toxic-appearing.   HENT:      Head: Normocephalic and atraumatic.      Nose: Nose normal.      Mouth/Throat:      Mouth: Mucous membranes are moist.   Cardiovascular:      Rate and Rhythm: Normal rate and regular rhythm.      Pulses: Normal pulses.      Heart sounds: Normal heart sounds. No murmur heard.    No gallop.   Pulmonary:      Effort: Pulmonary effort is normal. No respiratory distress.      Breath sounds: Normal breath sounds. No wheezing or rales.   Abdominal:      General: Bowel sounds are normal. There is no distension.      Palpations: Abdomen is soft.      Tenderness: There is no abdominal tenderness. There is no guarding.   Musculoskeletal:      Right lower leg: No edema.      Left lower leg: Edema  present.   Skin:     General: Skin is warm and dry.   Neurological:      Mental Status: He is alert. Mental status is at baseline.       Significant Labs: All pertinent labs within the past 24 hours have been reviewed.    Significant Imaging: I have reviewed all pertinent imaging results/findings within the past 24 hours.

## 2022-05-16 NOTE — CONSULTS
[facility]  Cardiology  Consult Note    Patient Name: Colten Serrato  MRN: [id number]  Admission Date: 5/13/2022  Hospital Length of Stay: 2 days  Code Status: Full Code   Attending Provider: Oumou Larson MD   Consulting Provider: Jami Marshall MD  Primary Care Physician: Provider [employer]  Principal Problem:Acute deep vein thrombosis (DVT) of left lower extremity    Patient information was obtained from patient and ER records.     Inpatient consult to Cardiology  Consult performed by: Jami Marshall MD  Consult ordered by: Joey Chairez MD        Subjective:     Chief Complaint:  DVT     HPI:   26 y.o. male with HTN, ADHD, tobacco use, obesity, and history of DVT as a child presents with a complaint of left lower ext pain.  Associated with some swelling, acute onset, described as similar to prior DVT, no known exacerbating or alleviating factors.  Denies fever, chills, cough, SOB, chest pain, palpitations, dizziness syncope, nausea,, diarrhea, abdominal pain complaining of black stools, dysuria.  In the ED he was found to have extensive DVT and bilateral PE.  Tachycardia noted.  Otherwise unremarkable for acute abnormality.  Started on full-dose enoxaparin.    Cardiology has been consulted.  Evaluate for DVT thrombectomy.  Patient has been on Lovenox.  However no relief.  Still has pain on walking in his left leg.  Swelling is still there.  No shortness of breath.  Saturating fine on room air.  No evidence of right heart strain on echocardiogram.      Impression:     Suboptimal timing of the intravenous bolus.  However, filling defects are seen bilaterally in the pulmonary arteries.  Findings suggest bilateral acute pulmonary emboli.     Pulmonary trunk greater in caliber than the corresponding ascending thoracic aorta.  Possibility of pulmonary hypertension should be considered.     Subtle bilateral ground-glass opacities.  Findings are nonspecific and may represent pneumonitis, crowding  of the bronchovascular structures, atelectasis, or other etiology.  Moderate bilateral linear scarring or atelectasis.      · The left ventricle is normal in size with normal systolic function.  · The estimated ejection fraction is 60%.  · Normal left ventricular diastolic function.  · Normal right ventricular size with normal right ventricular systolic function.  · Normal central venous pressure (3 mmHg).  · The estimated PA systolic pressure is 28 mmHg.         Past Medical History:   Diagnosis Date    Hypertension        Past Surgical History:   Procedure Laterality Date    CHOLECYSTECTOMY         Review of patient's allergies indicates:  No Known Allergies    No current facility-administered medications on file prior to encounter.     Current Outpatient Medications on File Prior to Encounter   Medication Sig    propranoloL (INDERAL) 40 MG tablet Take 40 mg by mouth 2 (two) times daily.    hydrOXYzine HCl (ATARAX) 25 MG tablet Take 1 tablet (25 mg total) by mouth every 6 (six) hours.     Family History    None       Tobacco Use    Smoking status: Current Every Day Smoker     Packs/day: 1.50    Smokeless tobacco: Never Used   Substance and Sexual Activity    Alcohol use: No    Drug use: Never    Sexual activity: Not on file     Review of Systems   Constitutional: Negative.   HENT: Negative.     Eyes: Negative.    Cardiovascular:  Positive for leg swelling.   Respiratory: Negative.     Endocrine: Negative.    Hematologic/Lymphatic: Negative.    Skin: Negative.    Musculoskeletal: Negative.    Gastrointestinal: Negative.    Genitourinary: Negative.    Neurological: Negative.    Psychiatric/Behavioral: Negative.     Allergic/Immunologic: Negative.    Objective:     Vital Signs (Most Recent):  Temp: 98.8 °F (37.1 °C) (05/16/22 0716)  Pulse: 97 (05/16/22 0716)  Resp: 18 (05/16/22 0848)  BP: 135/71 (05/16/22 0849)  SpO2: 97 % (05/16/22 0716) Vital Signs (24h Range):  Temp:  [97.9 °F (36.6 °C)-99.9 °F (37.7 °C)]  98.8 °F (37.1 °C)  Pulse:  [] 97  Resp:  [16-20] 18  SpO2:  [94 %-99 %] 97 %  BP: (126-144)/(71-83) 135/71     Weight: 115.1 kg (253 lb 11.2 oz)  Body mass index is 38.58 kg/m².    SpO2: 97 %  O2 Device (Oxygen Therapy): room air      Intake/Output Summary (Last 24 hours) at 5/16/2022 1030  Last data filed at 5/16/2022 0500  Gross per 24 hour   Intake 480 ml   Output 1805 ml   Net -1325 ml       Lines/Drains/Airways       Peripheral Intravenous Line  Duration                  Peripheral IV - Single Lumen 05/13/22 1818 20 G Right Antecubital 2 days                    Physical Exam  Vitals reviewed.   Constitutional:       Appearance: He is well-developed.   HENT:      Head: Normocephalic.   Eyes:      Conjunctiva/sclera: Conjunctivae normal.      Pupils: Pupils are equal, round, and reactive to light.   Cardiovascular:      Rate and Rhythm: Normal rate and regular rhythm.      Heart sounds: Normal heart sounds.   Pulmonary:      Effort: Pulmonary effort is normal.      Breath sounds: Normal breath sounds.   Abdominal:      General: Bowel sounds are normal.      Palpations: Abdomen is soft.   Musculoskeletal:      Cervical back: Normal range of motion and neck supple.   Skin:     General: Skin is warm.   Neurological:      Mental Status: He is alert and oriented to person, place, and time.       Significant Labs: BMP:   Recent Labs   Lab 05/15/22  0528 05/16/22  0347   * 93    139   K 4.2 4.4    101   CO2 28 29   BUN 12 14   CREATININE 0.9 1.0   CALCIUM 9.2 9.4   , CMP   Recent Labs   Lab 05/15/22  0528 05/16/22  0347    139   K 4.2 4.4    101   CO2 28 29   * 93   BUN 12 14   CREATININE 0.9 1.0   CALCIUM 9.2 9.4   ANIONGAP 9 9   ESTGFRAFRICA >60 >60   EGFRNONAA >60 >60   , CBC   Recent Labs   Lab 05/14/22  1059 05/15/22  0528 05/16/22  0347   WBC 14.48* 15.25* 15.67*   HGB 14.1 14.6 13.9*   HCT 44.1 45.0 43.6    157 184   , INR No results for input(s): INR, PROTIME in  the last 48 hours., Lipid Panel No results for input(s): CHOL, HDL, LDLCALC, TRIG, CHOLHDL in the last 48 hours., Troponin No results for input(s): TROPONINI in the last 48 hours., and All pertinent lab results from the last 24 hours have been reviewed.    Significant Imaging: Echocardiogram: Transthoracic echo (TTE) complete (Cupid Only):   Results for orders placed or performed during the hospital encounter of 05/13/22   Echo   Result Value Ref Range    BSA 2.35 m2    TDI SEPTAL 0.13 m/s    LV LATERAL E/E' RATIO 4.00 m/s    LV SEPTAL E/E' RATIO 5.85 m/s    LA WIDTH 3.48 cm    AORTIC VALVE CUSP SEPERATION 2.25 cm    TDI LATERAL 0.19 m/s    PV PEAK VELOCITY 0.99 cm/s    LVIDd 4.72 3.5 - 6.0 cm    IVS 1.08 0.6 - 1.1 cm    Posterior Wall 1.15 (A) 0.6 - 1.1 cm    Ao root annulus 3.26 cm    LVIDs 3.29 2.1 - 4.0 cm    FS 30 28 - 44 %    LA volume 52.34 cm3    STJ 2.75 cm    Ascending aorta 3.19 cm    LV mass 192.42 g    LA size 3.60 cm    RVDD 3.03 cm    TAPSE 2.33 cm    RV S' 16.31 cm/s    Left Ventricle Relative Wall Thickness 0.49 cm    AV mean gradient 5 mmHg    AV valve area 3.67 cm2    AV Velocity Ratio 0.84     AV index (prosthetic) 0.92     MV valve area p 1/2 method 4.42 cm2    E/A ratio 1.69     Mean e' 0.16 m/s    E wave deceleration time 171.84 msec    LVOT diameter 2.25 cm    LVOT area 4.0 cm2    LVOT peak anthony 1.23 m/s    LVOT peak VTI 22.02 cm    Ao peak anthony 1.47 m/s    Ao VTI 23.83 cm    LVOT stroke volume 87.51 cm3    AV peak gradient 9 mmHg    E/E' ratio 4.75 m/s    MV Peak E Anthony 0.76 m/s    TR Max Anthony 2.50 m/s    MV stenosis pressure 1/2 time 49.83 ms    MV Peak A Anthony 0.45 m/s    LV Systolic Volume 43.94 mL    LV Systolic Volume Index 19.4 mL/m2    LV Diastolic Volume 103.14 mL    LV Diastolic Volume Index 45.64 mL/m2    LA Volume Index 23.2 mL/m2    LV Mass Index 85 g/m2    RA Major Axis 4.90 cm    Left Atrium Minor Axis 4.77 cm    Left Atrium Major Axis 5.07 cm    Triscuspid Valve Regurgitation Peak  Gradient 25 mmHg    RA Width 3.18 cm    Right Atrial Pressure (from IVC) 3 mmHg    EF 60 %    TV rest pulmonary artery pressure 28 mmHg    Narrative    · The left ventricle is normal in size with normal systolic function.  · The estimated ejection fraction is 60%.  · Normal left ventricular diastolic function.  · Normal right ventricular size with normal right ventricular systolic   function.  · Normal central venous pressure (3 mmHg).  · The estimated PA systolic pressure is 28 mmHg.        Assessment and Plan:     * Acute deep vein thrombosis (DVT) of left lower extremity  Symptomatic DVT of left lower leg.  Discussed various options.  Because of severity of pain, will take patient for DVT thrombectomy today.      Risks, benefits and alternatives of the  dvt thrombectomy procedure were discussed with the patient.The risks of thrombectomy include but are not limited to: bleeding, infection, death, heart attack, arrhythmia, kidney injury or failure, potential need for dialysis, allergic reactions, stroke, need for emergency surgery, hematoma, pseudoaneurysm, injury to  blood vessels etc.   The risks of moderate sedation include hypotension, respiratory depression, arrhythmias, bronchospasm, and death. Informed consent was obtained and the  patient is agreeable to proceed with the procedure. Consent was placed on the chart.      Tobacco use        Class 2 severe obesity with serious comorbidity in adult        Essential hypertension        Bilateral pulmonary embolism  No evidence of right heart strain.  Troponin negative.  BNP normal.  No indication for thrombectomy.  RV/LV ratio less than 1.         VTE Risk Mitigation (From admission, onward)         Ordered     Reason for No Pharmacological VTE Prophylaxis  Once        Question:  Reasons:  Answer:  Already adequately anticoagulated on oral Anticoagulants    05/13/22 2129     IP VTE HIGH RISK PATIENT  Once         05/13/22 2129     Place sequential compression  device  Until discontinued         05/13/22 2129     enoxaparin injection 105 mg  Every 12 hours (non-standard times)         05/13/22 2129                Thank you for your consult. I will follow-up with patient. Please contact us if you have any additional questions.    Jami Marshall MD  Cardiology   [facility]

## 2022-05-16 NOTE — ASSESSMENT & PLAN NOTE
-Doppler US of left leg showed thrombosis of all of the deep veins in the left lower extremity  -CTA chest showed bilateral acute pulmonary emboli with pulmonary trunk greater in caliber than the corresponding ascending thoracic aorta and subtle bilateral ground-glass opacities.  ?early pulmonary infarct?  -Troponin and BNP are normal.  -Echo was normal without evidence of right heart strain.  -Etiology of VTE unclear.  Appears unprovoked.  No prolonged immobility or trauma.  Notes his father had history of blood clots.  -Will need outpatient workup for hypercoagulable states.  -Dr Marshall consulted- plan thrombectomy today  -Continue full dose lovenox  - Eliquis sent to pharmacy for price check today   - Anticipate discharge tomorrow

## 2022-05-16 NOTE — ASSESSMENT & PLAN NOTE
No evidence of right heart strain.  Troponin negative.  BNP normal.  No indication for thrombectomy.  RV/LV ratio less than 1.

## 2022-05-16 NOTE — HPI
26 y.o. male with HTN, ADHD, tobacco use, obesity, and history of DVT as a child presents with a complaint of left lower ext pain.  Associated with some swelling, acute onset, described as similar to prior DVT, no known exacerbating or alleviating factors.  Denies fever, chills, cough, SOB, chest pain, palpitations, dizziness syncope, nausea,, diarrhea, abdominal pain complaining of black stools, dysuria.  In the ED he was found to have extensive DVT and bilateral PE.  Tachycardia noted.  Otherwise unremarkable for acute abnormality.  Started on full-dose enoxaparin.    Cardiology has been consulted.  Evaluate for DVT thrombectomy.  Patient has been on Lovenox.  However no relief.  Still has pain on walking in his left leg.  Swelling is still there.  No shortness of breath.  Saturating fine on room air.  No evidence of right heart strain on echocardiogram.      Impression:     Suboptimal timing of the intravenous bolus.  However, filling defects are seen bilaterally in the pulmonary arteries.  Findings suggest bilateral acute pulmonary emboli.     Pulmonary trunk greater in caliber than the corresponding ascending thoracic aorta.  Possibility of pulmonary hypertension should be considered.     Subtle bilateral ground-glass opacities.  Findings are nonspecific and may represent pneumonitis, crowding of the bronchovascular structures, atelectasis, or other etiology.  Moderate bilateral linear scarring or atelectasis.      The left ventricle is normal in size with normal systolic function.  The estimated ejection fraction is 60%.  Normal left ventricular diastolic function.  Normal right ventricular size with normal right ventricular systolic function.  Normal central venous pressure (3 mmHg).  The estimated PA systolic pressure is 28 mmHg.

## 2022-05-17 VITALS
TEMPERATURE: 99 F | WEIGHT: 253.69 LBS | BODY MASS INDEX: 38.45 KG/M2 | HEIGHT: 68 IN | OXYGEN SATURATION: 97 % | RESPIRATION RATE: 19 BRPM | SYSTOLIC BLOOD PRESSURE: 135 MMHG | HEART RATE: 86 BPM | DIASTOLIC BLOOD PRESSURE: 75 MMHG

## 2022-05-17 LAB
ALLENS TEST: ABNORMAL
ALLENS TEST: ABNORMAL
GLUCOSE SERPL-MCNC: NORMAL MG/DL (ref 70–110)
POC ACTIVATED CLOTTING TIME K: 238 SEC (ref 74–137)
POC ACTIVATED CLOTTING TIME K: 279 SEC (ref 74–137)
POCT GLUCOSE: 109 MG/DL (ref 70–110)
SAMPLE: ABNORMAL
SAMPLE: ABNORMAL
SITE: ABNORMAL
SITE: ABNORMAL

## 2022-05-17 PROCEDURE — 25000003 PHARM REV CODE 250: Performed by: HOSPITALIST

## 2022-05-17 PROCEDURE — 99233 SBSQ HOSP IP/OBS HIGH 50: CPT | Mod: ,,, | Performed by: INTERNAL MEDICINE

## 2022-05-17 PROCEDURE — 99233 PR SUBSEQUENT HOSPITAL CARE,LEVL III: ICD-10-PCS | Mod: ,,, | Performed by: INTERNAL MEDICINE

## 2022-05-17 PROCEDURE — 63600175 PHARM REV CODE 636 W HCPCS: Performed by: INTERNAL MEDICINE

## 2022-05-17 PROCEDURE — 63600175 PHARM REV CODE 636 W HCPCS: Performed by: HOSPITALIST

## 2022-05-17 RX ORDER — OXYCODONE HYDROCHLORIDE 10 MG/1
10 TABLET ORAL EVERY 8 HOURS PRN
Qty: 15 TABLET | Refills: 0 | Status: SHIPPED | OUTPATIENT
Start: 2022-05-17 | End: 2023-06-02

## 2022-05-17 RX ORDER — ENOXAPARIN SODIUM 150 MG/ML
1 INJECTION SUBCUTANEOUS EVERY 12 HOURS
Qty: 11.2 ML | Refills: 0 | Status: SHIPPED | OUTPATIENT
Start: 2022-05-17 | End: 2022-05-24

## 2022-05-17 RX ADMIN — MORPHINE SULFATE 3 MG: 4 INJECTION INTRAVENOUS at 09:05

## 2022-05-17 RX ADMIN — PROPRANOLOL HYDROCHLORIDE 40 MG: 40 TABLET ORAL at 08:05

## 2022-05-17 RX ADMIN — ENOXAPARIN SODIUM 105 MG: 120 INJECTION SUBCUTANEOUS at 08:05

## 2022-05-17 NOTE — NURSING
RAPID RESPONSE NURSE PROACTIVE ROUNDING NOTE       Time of Visit:     Admit Date: 2022  LOS: 3  Code Status: Full Code   Date of Visit: 2022  : 1995  Age: 26 y.o.  Sex: male  Race: White  Bed: W415/W415 A:   MRN: [id number]  Was the patient discharged from an ICU this admission? No   Was the patient discharged from a PACU within last 24 hours? No   Did the patient receive conscious sedation/general anesthesia in last 24 hours? No   Was the patient in the ED within the past 24 hours? No   Was the patient on NIPPV within the past 24 hours? No   Attending Physician: Oumou Larson MD  Primary Service: Hospitalist   Time spent at the bedside: < 15 min    SITUATION    Notified by Charge and bedside RN during rounding  Reason for alert: Stated pt pain had been difficult to control when arrived to floor from cath lab, L leg swelling    Diagnosis: Acute deep vein thrombosis (DVT) of left lower extremity   has a past medical history of Hypertension.    Last Vitals:  Temp: 97.9 °F (36.6 °C) (2338)  Pulse: 97 (2338)  Resp: 20 (2344)  BP: 136/85 (2338)  SpO2: 99 % (2338)    24 Hour Vitals Range:  Temp:  [97.9 °F (36.6 °C)-100.1 °F (37.8 °C)]   Pulse:  []   Resp:  [16-22]   BP: (110-143)/(62-85)   SpO2:  [94 %-99 %]     Clinical Issues: Circulatory    ASSESSMENT/INTERVENTIONS  Pt with thrombectomy of L leg today. Popliteal dressing CDI. Dorsalis pedis pulses palpable. L leg slightly more swollen than R. Sensation in L foot present. L foot warm, coloring WNL. Pt states feeling ok and pain controlled at this time.     RECOMMENDATIONS  Monitor.     Discussed plan of care with bedside RNDisha    PROVIDER ESCALATION    Physician escalation: No    Orders received and case discussed with NA.    Disposition:Remain in room 415    FOLLOW UP    Call back the Rapid Response NurseKaitlin at [phone] for additional questions or concerns.

## 2022-05-17 NOTE — DISCHARGE SUMMARY
[facility] Medicine  Discharge Summary      Patient Name: Colten Serrato  MRN: [id number]  Patient Class: IP- Inpatient  Admission Date: 5/13/2022  Hospital Length of Stay: 3 days  Discharge Date and Time:  05/17/2022 9:53 AM  Attending Physician: Bertrand Alvarez MD   Discharging Provider: Bertrand Alvarez MD  Primary Care Provider: Provider Notinsystem      HPI:   26 y.o. male with HTN, ADHD, tobacco use, obesity, and history of DVT as a child presents with a complaint of left lower ext pain.  Associated with some swelling, acute onset, described as similar to prior DVT, no known exacerbating or alleviating factors.  Denies fever, chills, cough, SOB, chest pain, palpitations, dizziness syncope, nausea,, diarrhea, abdominal pain complaining of black stools, dysuria.  In the ED he was found to have extensive DVT and bilateral PE.  Tachycardia noted.  Otherwise unremarkable for acute abnormality.  Started on full-dose enoxaparin.      Procedure(s) (LRB):  THROMBECTOMY (Left)  Venogram, Cath Lab (Left)  PTA, Iliac Vein (Left)      Hospital Course:   26 year old man with history of hypertension, ADHD, obesity, tobacco use, upper extremity DVT as a baby who presented for evaluation of left leg pain.  He was diagnosed with extensive DVT in left leg and bilateral pulmonary emboli.  He has no chest pain or shortness of breath.  Echo does not show any evidence of right heart strain.  He has severe pain in his left leg requiring iv morphine.  Dr Marshall consulted for thrombectomy on 5/16. Of note this is an unprovoked DVT, his father had blood clots and he had an upper extremity DVT as a child.  He will need close follow up with hematology oncology for hypercoagulable workup.  Patient underwent successful mechanical DVT thrombectomy with INARI clottriever was performed of the left common iliac, left external iliac, left common femoral, left femoral  veins with extirpition of matter on 5/16.  Patient has  significant clot burden and still having significant LLE pain post procedure.  Initially is was severe pain at rest, now only upon standing up and applying pressure to leg.  Discussed with Dr. Marshall and pain will probably take several days to improve secondary to significant clot burden.  He has remained afebrile and hemodynamically stable.  Because of signifcant clot, Dr. Marshall recommending one week of therapeutic Lovenox and then transitiioning to Eliquis.  Prescriptions sent to pharmacy.  He continues with no chest pain, dyspnea or hypoxia.  Patient will be discharged home to follow up [facility] Dr. Marshall and Heme/Onc.       Goals of Care Treatment Preferences:  Code Status: Full Code      Consults:   Consults (From admission, onward)        Status Ordering Provider     Inpatient consult to Cardiology  Once        Provider:  Jami Marshall MD    Completed MIGUE BUTLER          No new Assessment & Plan notes have been filed under this hospital service since the last note was generated.  Service: Hospital Medicine    Final Active Diagnoses:    Diagnosis Date Noted POA    PRINCIPAL PROBLEM:  Acute deep vein thrombosis (DVT) of left lower extremity [I82.402] 05/13/2022 Yes    Bilateral pulmonary embolism [I26.99] 05/13/2022 Yes    Essential hypertension [I10] 05/13/2022 Yes     Chronic    Class 2 severe obesity with serious comorbidity in adult [E66.01] 05/13/2022 Yes     Chronic    Tobacco use [Z72.0] 05/13/2022 Yes     Chronic      Problems Resolved During this Admission:       Discharged Condition: stable    Disposition: Home or Self Care    Follow Up:   Follow-up Information     Jami Marshall MD Follow up in 1 week(s).    Specialties: Cardiology, Interventional Cardiology  Contact information:  [address]  [address] [place] [address] [id number] [phone]             Maile Londono MD Follow up in 3 week(s).    Specialties: Hematology and Oncology, Hematology  Contact information:  [address]  [address]  [place] [address]  [phone]             Provider Notinsystem .                     Patient Instructions:      COMPRESSION STOCKINGS     Order Specific Question Answer Comments   Pressure amount: 20-30 mmHg      Diet Cardiac     Notify your health care provider if you experience any of the following:  temperature >100.4     Notify your health care provider if you experience any of the following:  persistent nausea and vomiting or diarrhea     Notify your health care provider if you experience any of the following:  severe uncontrolled pain     Notify your health care provider if you experience any of the following:  difficulty breathing or increased cough     Notify your health care provider if you experience any of the following:  persistent dizziness, light-headedness, or visual disturbances     Notify your health care provider if you experience any of the following:  increased confusion or weakness     Activity as tolerated           Pending Diagnostic Studies:     None         Medications:  Reconciled Home Medications:      Medication List      START taking these medications    apixaban 5 mg Tab  Commonly known as: ELIQUIS  Take 1 tablet (5 mg total) by mouth 2 (two) times daily.  Start taking on: May 24, 2022     enoxaparin 120 mg/0.8 mL Syrg  Commonly known as: LOVENOX  Inject 0.7 mLs (105 mg total) into the skin every 12 (twelve) hours for 7 days. Stop lovenox on 5/24, then start eliquis on 5/25.     oxyCODONE 10 mg Tab immediate release tablet  Commonly known as: ROXICODONE  Take 1 tablet (10 mg total) by mouth every 8 (eight) hours as needed for Pain.        CONTINUE taking these medications    propranoloL 40 MG tablet  Commonly known as: INDERAL  Take 40 mg by mouth 2 (two) times daily.        STOP taking these medications    hydrOXYzine HCL 25 MG tablet  Commonly known as: ATARAX            Indwelling Lines/Drains at time of discharge:   Lines/Drains/Airways     None                 Time spent on  the discharge of patient: >30 minutes         Bertrand Alvarez MD  Department of Hospital Medicine  [facility] Surg

## 2022-05-17 NOTE — ASSESSMENT & PLAN NOTE
Symptomatic DVT of left lower leg.  Discussed various options.  Because of severity of pain, will take patient for DVT thrombectomy today.      Risks, benefits and alternatives of the  dvt thrombectomy procedure were discussed with the patient.The risks of thrombectomy include but are not limited to: bleeding, infection, death, heart attack, arrhythmia, kidney injury or failure, potential need for dialysis, allergic reactions, stroke, need for emergency surgery, hematoma, pseudoaneurysm, injury to  blood vessels etc.   The risks of moderate sedation include hypotension, respiratory depression, arrhythmias, bronchospasm, and death. Informed consent was obtained and the  patient is agreeable to proceed with the procedure. Consent was placed on the chart.      5/17:  Status post thrombectomy yesterday.  Resting pain in the left leg has gone away.  Swelling has decreased significantly.  Patient feeling much better.  No complications from thrombectomy.  Lovenox at therapeutic dosing for 7 days followed by Eliquis at DVT PE dosing.  Continue Eliquis lifelong since this is his 2nd episode of DVT.  Heme-Onc workup for hypercoagulable workup.  Follow-up in Cardiology Clinic and Heme-Onc clinics.

## 2022-05-17 NOTE — NURSING
Pt and family member awake and alert during d/c instruction. Reviewed new prescriptions, med to bed completed. Pt family member verbalized that he just took his pain medication. Pt reports that he had not had anything since this morning and was in pain. With Mrs Cortés present, educated pt that even though he has discharge order, I am still the primary that should administer medication being that he is still present on unit. The medications were brought to bedside by pharmacy, for him to take when discharge complete. Demonstration of SQ Lovenox injection complete, pt verbalized understanding, will have family member at home administer. No distress noted, PIV removed from LFA, site without pain, redness, swelling or drainage. Pt escorted via wheelchair to personal vehicle driven by family member.

## 2022-05-17 NOTE — PLAN OF CARE
Post-cath procedure completed  with no acute concerns noted.  LLE  distal pulses palpable ,sensation and movement intact. PRN analgesics administered per pt  request for  LLE pain. Pt remained awake,alert and oriented  to person,place, time and situation. Rested intermittently in bed with eyes closed with  even rise and fall of chest noted. Afebrile.Remains on  room with sat readings  within ordered parameters..IVF infusing as ordered. Pt tolerating diet with no nausea noted. Voided per urinal. Safety measures  in place all shift ;Bed in lowest position  with wheels locked and alarm on. Call bell within reach at all times;Pt able to make needs known to staff.  Verbalization of feelings promoted and validated. Will continue to monitor and follow treatment plan.   Problem: Adult Inpatient Plan of Care  Goal: Plan of Care Review  Outcome: Ongoing, Progressing  Goal: Patient-Specific Goal (Individualized)  Outcome: Ongoing, Progressing  Goal: Absence of Hospital-Acquired Illness or Injury  Outcome: Ongoing, Progressing  Goal: Optimal Comfort and Wellbeing  Outcome: Ongoing, Progressing  Goal: Readiness for Transition of Care  Outcome: Ongoing, Progressing     Problem: Fall Injury Risk  Goal: Absence of Fall and Fall-Related Injury  Outcome: Ongoing, Progressing     Problem: Pain Acute  Goal: Acceptable Pain Control and Functional Ability  Outcome: Ongoing, Progressing

## 2022-05-17 NOTE — PLAN OF CARE
[facility] - Med Surg  Discharge Final Note    Patient clear to discharge from case management stand point. Reviewed follow up appointments for cardiology and hematology with patient, listed on avs verbalized understanding. Pt stated his sister will be helping him home at discharge.     Primary Care Provider: Provider Notinsystem    Expected Discharge Date: 5/17/2022    Final Discharge Note (most recent)     Final Note - 05/17/22 1205        Final Note    Assessment Type Final Discharge Note     Anticipated Discharge Disposition Home or Self Care     What phone number can be called within the next 1-3 days to see how you are doing after discharge? [phone]     Hospital Resources/Appts/Education Provided Provided patient/caregiver with written discharge plan information;Appointments scheduled and added to AVS        Post-Acute Status    Coverage [facility]     Discharge Delays None known at this time                 Important Message from Medicare

## 2022-05-17 NOTE — PROGRESS NOTES
[facility] Surg  Cardiology  Progress Note    Patient Name: Colten Serrato  MRN: [id number]  Admission Date: 5/13/2022  Hospital Length of Stay: 3 days  Code Status: Prior   Attending Physician: No att. providers found   Primary Care Physician: Provider Notinsystem  Expected Discharge Date: 5/17/2022  Principal Problem:Acute deep vein thrombosis (DVT) of left lower extremity    Subjective:       Interval History:  Status post thrombectomy yesterday.  States resting pain has gone away.  Still has some pain on walking.  However normal resting pain and feels much better as compared to yesterday.    Review of Systems   Constitutional: Negative.   HENT: Negative.     Eyes: Negative.    Cardiovascular:  Positive for leg swelling.   Respiratory: Negative.     Endocrine: Negative.    Hematologic/Lymphatic: Negative.    Skin: Negative.    Musculoskeletal: Negative.    Gastrointestinal: Negative.    Genitourinary: Negative.    Neurological: Negative.    Psychiatric/Behavioral: Negative.     Allergic/Immunologic: Negative.    Objective:     Vital Signs (Most Recent):  Temp: 99.1 °F (37.3 °C) (05/17/22 1135)  Pulse: 86 (05/17/22 1135)  Resp: 19 (05/17/22 1135)  BP: 135/75 (05/17/22 1135)  SpO2: 97 % (05/17/22 1135) Vital Signs (24h Range):  Temp:  [97.9 °F (36.6 °C)-99.9 °F (37.7 °C)] 99.1 °F (37.3 °C)  Pulse:  [] 86  Resp:  [18-22] 19  SpO2:  [94 %-99 %] 97 %  BP: (110-143)/(61-85) 135/75     Weight: 115.1 kg (253 lb 11.2 oz)  Body mass index is 38.58 kg/m².     SpO2: 97 %  O2 Device (Oxygen Therapy): room air      Intake/Output Summary (Last 24 hours) at 5/17/2022 1644  Last data filed at 5/17/2022 1250  Gross per 24 hour   Intake 1850.39 ml   Output 2650 ml   Net -799.61 ml       Lines/Drains/Airways       None                   Physical Exam  Vitals reviewed.   Constitutional:       Appearance: He is well-developed.   HENT:      Head: Normocephalic.   Eyes:      Conjunctiva/sclera: Conjunctivae normal.       Pupils: Pupils are equal, round, and reactive to light.   Cardiovascular:      Rate and Rhythm: Normal rate and regular rhythm.      Heart sounds: Normal heart sounds.   Pulmonary:      Effort: Pulmonary effort is normal.      Breath sounds: Normal breath sounds.   Abdominal:      General: Bowel sounds are normal.      Palpations: Abdomen is soft.   Musculoskeletal:      Cervical back: Normal range of motion and neck supple.      Left lower leg: Edema present.   Skin:     General: Skin is warm.   Neurological:      Mental Status: He is alert and oriented to person, place, and time.     Significant Labs: BMP:   Recent Labs   Lab 05/16/22  0347   GLU 93      K 4.4      CO2 29   BUN 14   CREATININE 1.0   CALCIUM 9.4   , CMP   Recent Labs   Lab 05/16/22  0347      K 4.4      CO2 29   GLU 93   BUN 14   CREATININE 1.0   CALCIUM 9.4   ANIONGAP 9   ESTGFRAFRICA >60   EGFRNONAA >60   , CBC   Recent Labs   Lab 05/16/22  0347   WBC 15.67*   HGB 13.9*   HCT 43.6      , INR No results for input(s): INR, PROTIME in the last 48 hours., Lipid Panel No results for input(s): CHOL, HDL, LDLCALC, TRIG, CHOLHDL in the last 48 hours., Troponin No results for input(s): TROPONINI in the last 48 hours., and All pertinent lab results from the last 24 hours have been reviewed.    Significant Imaging: Echocardiogram: Transthoracic echo (TTE) complete (Cupid Only):   Results for orders placed or performed during the hospital encounter of 05/13/22   Echo   Result Value Ref Range    BSA 2.35 m2    TDI SEPTAL 0.13 m/s    LV LATERAL E/E' RATIO 4.00 m/s    LV SEPTAL E/E' RATIO 5.85 m/s    LA WIDTH 3.48 cm    AORTIC VALVE CUSP SEPERATION 2.25 cm    TDI LATERAL 0.19 m/s    PV PEAK VELOCITY 0.99 cm/s    LVIDd 4.72 3.5 - 6.0 cm    IVS 1.08 0.6 - 1.1 cm    Posterior Wall 1.15 (A) 0.6 - 1.1 cm    Ao root annulus 3.26 cm    LVIDs 3.29 2.1 - 4.0 cm    FS 30 28 - 44 %    LA volume 52.34 cm3    STJ 2.75 cm    Ascending aorta  3.19 cm    LV mass 192.42 g    LA size 3.60 cm    RVDD 3.03 cm    TAPSE 2.33 cm    RV S' 16.31 cm/s    Left Ventricle Relative Wall Thickness 0.49 cm    AV mean gradient 5 mmHg    AV valve area 3.67 cm2    AV Velocity Ratio 0.84     AV index (prosthetic) 0.92     MV valve area p 1/2 method 4.42 cm2    E/A ratio 1.69     Mean e' 0.16 m/s    E wave deceleration time 171.84 msec    LVOT diameter 2.25 cm    LVOT area 4.0 cm2    LVOT peak anthony 1.23 m/s    LVOT peak VTI 22.02 cm    Ao peak anthony 1.47 m/s    Ao VTI 23.83 cm    LVOT stroke volume 87.51 cm3    AV peak gradient 9 mmHg    E/E' ratio 4.75 m/s    MV Peak E Anthony 0.76 m/s    TR Max Anthony 2.50 m/s    MV stenosis pressure 1/2 time 49.83 ms    MV Peak A Anthony 0.45 m/s    LV Systolic Volume 43.94 mL    LV Systolic Volume Index 19.4 mL/m2    LV Diastolic Volume 103.14 mL    LV Diastolic Volume Index 45.64 mL/m2    LA Volume Index 23.2 mL/m2    LV Mass Index 85 g/m2    RA Major Axis 4.90 cm    Left Atrium Minor Axis 4.77 cm    Left Atrium Major Axis 5.07 cm    Triscuspid Valve Regurgitation Peak Gradient 25 mmHg    RA Width 3.18 cm    Right Atrial Pressure (from IVC) 3 mmHg    EF 60 %    TV rest pulmonary artery pressure 28 mmHg    Narrative    · The left ventricle is normal in size with normal systolic function.  · The estimated ejection fraction is 60%.  · Normal left ventricular diastolic function.  · Normal right ventricular size with normal right ventricular systolic   function.  · Normal central venous pressure (3 mmHg).  · The estimated PA systolic pressure is 28 mmHg.        Assessment and Plan:     Brief HPI:     * Acute deep vein thrombosis (DVT) of left lower extremity  Symptomatic DVT of left lower leg.  Discussed various options.  Because of severity of pain, will take patient for DVT thrombectomy today.      Risks, benefits and alternatives of the  dvt thrombectomy procedure were discussed with the patient.The risks of thrombectomy include but are not limited to:  bleeding, infection, death, heart attack, arrhythmia, kidney injury or failure, potential need for dialysis, allergic reactions, stroke, need for emergency surgery, hematoma, pseudoaneurysm, injury to  blood vessels etc.   The risks of moderate sedation include hypotension, respiratory depression, arrhythmias, bronchospasm, and death. Informed consent was obtained and the  patient is agreeable to proceed with the procedure. Consent was placed on the chart.      5/17:  Status post thrombectomy yesterday.  Resting pain in the left leg has gone away.  Swelling has decreased significantly.  Patient feeling much better.  No complications from thrombectomy.  Lovenox at therapeutic dosing for 7 days followed by Eliquis at DVT PE dosing.  Continue Eliquis lifelong since this is his 2nd episode of DVT.  Heme-Onc workup for hypercoagulable workup.  Follow-up in Cardiology Clinic and Heme-Onc clinics.    Tobacco use        Class 2 severe obesity with serious comorbidity in adult        Essential hypertension        Bilateral pulmonary embolism  No evidence of right heart strain.  Troponin negative.  BNP normal.  No indication for thrombectomy.  RV/LV ratio less than 1.         VTE Risk Mitigation (From admission, onward)         Ordered     IP VTE HIGH RISK PATIENT  Once         05/13/22 8013                Jami Marshall MD  Cardiology  [facility]

## 2022-05-17 NOTE — SUBJECTIVE & OBJECTIVE
Interval History:  Status post thrombectomy yesterday.  States resting pain has gone away.  Still has some pain on walking.  However normal resting pain and feels much better as compared to yesterday.    Review of Systems   Constitutional: Negative.   HENT: Negative.     Eyes: Negative.    Cardiovascular:  Positive for leg swelling.   Respiratory: Negative.     Endocrine: Negative.    Hematologic/Lymphatic: Negative.    Skin: Negative.    Musculoskeletal: Negative.    Gastrointestinal: Negative.    Genitourinary: Negative.    Neurological: Negative.    Psychiatric/Behavioral: Negative.     Allergic/Immunologic: Negative.    Objective:     Vital Signs (Most Recent):  Temp: 99.1 °F (37.3 °C) (05/17/22 1135)  Pulse: 86 (05/17/22 1135)  Resp: 19 (05/17/22 1135)  BP: 135/75 (05/17/22 1135)  SpO2: 97 % (05/17/22 1135) Vital Signs (24h Range):  Temp:  [97.9 °F (36.6 °C)-99.9 °F (37.7 °C)] 99.1 °F (37.3 °C)  Pulse:  [] 86  Resp:  [18-22] 19  SpO2:  [94 %-99 %] 97 %  BP: (110-143)/(61-85) 135/75     Weight: 115.1 kg (253 lb 11.2 oz)  Body mass index is 38.58 kg/m².     SpO2: 97 %  O2 Device (Oxygen Therapy): room air      Intake/Output Summary (Last 24 hours) at 5/17/2022 1644  Last data filed at 5/17/2022 1250  Gross per 24 hour   Intake 1850.39 ml   Output 2650 ml   Net -799.61 ml       Lines/Drains/Airways       None                   Physical Exam  Vitals reviewed.   Constitutional:       Appearance: He is well-developed.   HENT:      Head: Normocephalic.   Eyes:      Conjunctiva/sclera: Conjunctivae normal.      Pupils: Pupils are equal, round, and reactive to light.   Cardiovascular:      Rate and Rhythm: Normal rate and regular rhythm.      Heart sounds: Normal heart sounds.   Pulmonary:      Effort: Pulmonary effort is normal.      Breath sounds: Normal breath sounds.   Abdominal:      General: Bowel sounds are normal.      Palpations: Abdomen is soft.   Musculoskeletal:      Cervical back: Normal range of  motion and neck supple.      Left lower leg: Edema present.   Skin:     General: Skin is warm.   Neurological:      Mental Status: He is alert and oriented to person, place, and time.     Significant Labs: BMP:   Recent Labs   Lab 05/16/22  0347   GLU 93      K 4.4      CO2 29   BUN 14   CREATININE 1.0   CALCIUM 9.4   , CMP   Recent Labs   Lab 05/16/22  0347      K 4.4      CO2 29   GLU 93   BUN 14   CREATININE 1.0   CALCIUM 9.4   ANIONGAP 9   ESTGFRAFRICA >60   EGFRNONAA >60   , CBC   Recent Labs   Lab 05/16/22 0347   WBC 15.67*   HGB 13.9*   HCT 43.6      , INR No results for input(s): INR, PROTIME in the last 48 hours., Lipid Panel No results for input(s): CHOL, HDL, LDLCALC, TRIG, CHOLHDL in the last 48 hours., Troponin No results for input(s): TROPONINI in the last 48 hours., and All pertinent lab results from the last 24 hours have been reviewed.    Significant Imaging: Echocardiogram: Transthoracic echo (TTE) complete (Cupid Only):   Results for orders placed or performed during the hospital encounter of 05/13/22   Echo   Result Value Ref Range    BSA 2.35 m2    TDI SEPTAL 0.13 m/s    LV LATERAL E/E' RATIO 4.00 m/s    LV SEPTAL E/E' RATIO 5.85 m/s    LA WIDTH 3.48 cm    AORTIC VALVE CUSP SEPERATION 2.25 cm    TDI LATERAL 0.19 m/s    PV PEAK VELOCITY 0.99 cm/s    LVIDd 4.72 3.5 - 6.0 cm    IVS 1.08 0.6 - 1.1 cm    Posterior Wall 1.15 (A) 0.6 - 1.1 cm    Ao root annulus 3.26 cm    LVIDs 3.29 2.1 - 4.0 cm    FS 30 28 - 44 %    LA volume 52.34 cm3    STJ 2.75 cm    Ascending aorta 3.19 cm    LV mass 192.42 g    LA size 3.60 cm    RVDD 3.03 cm    TAPSE 2.33 cm    RV S' 16.31 cm/s    Left Ventricle Relative Wall Thickness 0.49 cm    AV mean gradient 5 mmHg    AV valve area 3.67 cm2    AV Velocity Ratio 0.84     AV index (prosthetic) 0.92     MV valve area p 1/2 method 4.42 cm2    E/A ratio 1.69     Mean e' 0.16 m/s    E wave deceleration time 171.84 msec    LVOT diameter 2.25 cm     LVOT area 4.0 cm2    LVOT peak anthony 1.23 m/s    LVOT peak VTI 22.02 cm    Ao peak anthony 1.47 m/s    Ao VTI 23.83 cm    LVOT stroke volume 87.51 cm3    AV peak gradient 9 mmHg    E/E' ratio 4.75 m/s    MV Peak E Anthony 0.76 m/s    TR Max Anthony 2.50 m/s    MV stenosis pressure 1/2 time 49.83 ms    MV Peak A Anthony 0.45 m/s    LV Systolic Volume 43.94 mL    LV Systolic Volume Index 19.4 mL/m2    LV Diastolic Volume 103.14 mL    LV Diastolic Volume Index 45.64 mL/m2    LA Volume Index 23.2 mL/m2    LV Mass Index 85 g/m2    RA Major Axis 4.90 cm    Left Atrium Minor Axis 4.77 cm    Left Atrium Major Axis 5.07 cm    Triscuspid Valve Regurgitation Peak Gradient 25 mmHg    RA Width 3.18 cm    Right Atrial Pressure (from IVC) 3 mmHg    EF 60 %    TV rest pulmonary artery pressure 28 mmHg    Narrative    · The left ventricle is normal in size with normal systolic function.  · The estimated ejection fraction is 60%.  · Normal left ventricular diastolic function.  · Normal right ventricular size with normal right ventricular systolic   function.  · Normal central venous pressure (3 mmHg).  · The estimated PA systolic pressure is 28 mmHg.

## 2022-05-17 NOTE — DISCHARGE INSTRUCTIONS
How to Give a Blood Thinner Shot   General   Many people give themselves blood thinner 1 or 2 times each day. The blood thinner is often given as a shot, using a small needle attached to a syringe. The shot goes into fatty tissue right under the skin. It is important to change where you give yourself the shots each time. Changing the shot site helps you avoid soreness and bruising. Changing the site also helps your body absorb the blood thinner. Try to avoid sites that are close to moles, scars, or wounds. Also do not give the shots in the area near your belly button.  How to Give a Blood Thinner Shot   Get your supplies together.  Wash and dry your hands.  Gather the blood thinner, syringe, and alcohol wipe.  Check the expiration date on the vial or syringe of blood thinner. If it is , do not use.  Prepare the syringe. You will either use a prefilled syringe or you will put the right dose of a drug into a syringe from a vial of the drug.  Using a prefilled syringe.  Check the label to make sure you have the right drug.  Do not get rid of the air bubble unless you need to adjust your dose. This small bubble helps to make sure you get all of the drug out of the syringe.  Drawing up the drug from a vial.  Check the label to make sure you have the right drug.  Take the plastic cap off of the bottle if it is a new bottle.  Gently, roll the bottle in your hands. Do not shake it.  Clean the top of the bottle with an alcohol pad and allow to air dry. Do not blow or fan to dry.  Pull the plunger back until the tip of the plunger is at the line indicating the number of units of blood thinner you need. This will fill the syringe with air.  Remove the cap over the needle.  Push the needle into the rubber stopper. Then push the plunger so the air goes into the bottle of blood thinner.  Turn the bottle with the syringe in it upside down and hold the vial with one hand. Pull back on the plunger with your other hand and  slowly pull the blood thinner into the syringe until it reaches the line for the dose you need.  Look at the blood thinner that is in the syringe. If you see any air bubbles, push the blood thinner back into the vial. Repeat this until you do not see any air bubbles in the syringe. You can also gently tap the syringe to move the air bubbles toward the needle.  Pull the needle straight out of the bottle.  Recheck to make sure it is the right dose.  If you are not giving the shot right away, cover the needle with the cap before putting it down. You may want to hold the syringe in one hand and scoop the cap onto the needle to lower your chance of poking yourself with the needle.  Give the shot.  Clean the area of skin where you plan to give the shot with alcohol. Allow the skin to air dry. Do not blow or fan to dry.  Relax your muscles where you will be giving the shot.  Pinch 1 to 2 inches (2.5 to 5 cm) of skin between your fingers and thumb on one hand.  Holding the syringe like you would a dart at a 90° angle. Push the needle all the way into the pinched up skin. Slowly, press plunger all the way down. You may feel some discomfort and burning when the blood thinner is going in. After all the blood thinner is in, wait a few seconds and then remove needle at the same 90° degree angle.  Apply gentle pressure to the injection site. Do not rub or massage the site.  Throw used needle in a special container made for needle disposal.  Put your blood thinner away.  Wash and dry your hands.

## 2022-05-23 ENCOUNTER — OFFICE VISIT (OUTPATIENT)
Dept: CARDIOLOGY | Facility: CLINIC | Age: 27
End: 2022-05-23
Payer: COMMERCIAL

## 2022-05-23 VITALS
BODY MASS INDEX: 36.65 KG/M2 | HEART RATE: 84 BPM | HEIGHT: 68 IN | DIASTOLIC BLOOD PRESSURE: 79 MMHG | SYSTOLIC BLOOD PRESSURE: 114 MMHG | OXYGEN SATURATION: 96 % | WEIGHT: 241.81 LBS

## 2022-05-23 DIAGNOSIS — I10 ESSENTIAL HYPERTENSION: Chronic | ICD-10-CM

## 2022-05-23 DIAGNOSIS — Z72.0 TOBACCO USE: Chronic | ICD-10-CM

## 2022-05-23 DIAGNOSIS — E66.01 CLASS 2 SEVERE OBESITY DUE TO EXCESS CALORIES WITH SERIOUS COMORBIDITY AND BODY MASS INDEX (BMI) OF 36.0 TO 36.9 IN ADULT: Chronic | ICD-10-CM

## 2022-05-23 DIAGNOSIS — I26.99 BILATERAL PULMONARY EMBOLISM: ICD-10-CM

## 2022-05-23 DIAGNOSIS — I82.4Z2 ACUTE DEEP VEIN THROMBOSIS (DVT) OF DISTAL VEIN OF LEFT LOWER EXTREMITY: ICD-10-CM

## 2022-05-23 DIAGNOSIS — E78.5 HYPERLIPIDEMIA, UNSPECIFIED HYPERLIPIDEMIA TYPE: Primary | ICD-10-CM

## 2022-05-23 PROCEDURE — 1111F DSCHRG MED/CURRENT MED MERGE: CPT | Mod: CPTII,S$GLB,, | Performed by: INTERNAL MEDICINE

## 2022-05-23 PROCEDURE — 99214 OFFICE O/P EST MOD 30 MIN: CPT | Mod: S$GLB,,, | Performed by: INTERNAL MEDICINE

## 2022-05-23 PROCEDURE — 3008F BODY MASS INDEX DOCD: CPT | Mod: CPTII,S$GLB,, | Performed by: INTERNAL MEDICINE

## 2022-05-23 PROCEDURE — 1159F MED LIST DOCD IN RCRD: CPT | Mod: CPTII,S$GLB,, | Performed by: INTERNAL MEDICINE

## 2022-05-23 PROCEDURE — 3008F PR BODY MASS INDEX (BMI) DOCUMENTED: ICD-10-PCS | Mod: CPTII,S$GLB,, | Performed by: INTERNAL MEDICINE

## 2022-05-23 PROCEDURE — 99214 PR OFFICE/OUTPT VISIT, EST, LEVL IV, 30-39 MIN: ICD-10-PCS | Mod: S$GLB,,, | Performed by: INTERNAL MEDICINE

## 2022-05-23 PROCEDURE — 99999 PR PBB SHADOW E&M-EST. PATIENT-LVL III: CPT | Mod: PBBFAC,,, | Performed by: INTERNAL MEDICINE

## 2022-05-23 PROCEDURE — 1160F PR REVIEW ALL MEDS BY PRESCRIBER/CLIN PHARMACIST DOCUMENTED: ICD-10-PCS | Mod: CPTII,S$GLB,, | Performed by: INTERNAL MEDICINE

## 2022-05-23 PROCEDURE — 99999 PR PBB SHADOW E&M-EST. PATIENT-LVL III: ICD-10-PCS | Mod: PBBFAC,,, | Performed by: INTERNAL MEDICINE

## 2022-05-23 PROCEDURE — 3074F SYST BP LT 130 MM HG: CPT | Mod: CPTII,S$GLB,, | Performed by: INTERNAL MEDICINE

## 2022-05-23 PROCEDURE — 3078F PR MOST RECENT DIASTOLIC BLOOD PRESSURE < 80 MM HG: ICD-10-PCS | Mod: CPTII,S$GLB,, | Performed by: INTERNAL MEDICINE

## 2022-05-23 PROCEDURE — 1160F RVW MEDS BY RX/DR IN RCRD: CPT | Mod: CPTII,S$GLB,, | Performed by: INTERNAL MEDICINE

## 2022-05-23 PROCEDURE — 1111F PR DISCHARGE MEDS RECONCILED W/ CURRENT OUTPATIENT MED LIST: ICD-10-PCS | Mod: CPTII,S$GLB,, | Performed by: INTERNAL MEDICINE

## 2022-05-23 PROCEDURE — 1159F PR MEDICATION LIST DOCUMENTED IN MEDICAL RECORD: ICD-10-PCS | Mod: CPTII,S$GLB,, | Performed by: INTERNAL MEDICINE

## 2022-05-23 PROCEDURE — 3078F DIAST BP <80 MM HG: CPT | Mod: CPTII,S$GLB,, | Performed by: INTERNAL MEDICINE

## 2022-05-23 PROCEDURE — 3074F PR MOST RECENT SYSTOLIC BLOOD PRESSURE < 130 MM HG: ICD-10-PCS | Mod: CPTII,S$GLB,, | Performed by: INTERNAL MEDICINE

## 2022-05-23 NOTE — PROGRESS NOTES
[facility]     Subjective:    Patient ID:  Colten Serrato is a 26 y.o. male who presents for follow-up of Deep Vein Thrombosis and Hypertension    Review of patient's allergies indicates:  No Known Allergies   He presented with left leg pain and swelling May 13th and was found to have extensive left leg DVT as well as evidence on CTA of chest of bilateral pulmonary emboli.  The patient underwent thrombectomy with Inari catheter system performed by Dr. Jami Marshall.  Treatment was given to left superficial vein left common femoral vein external iliac venous thrombectomy.  No thrombectomy of the pulmonary artery was needed.  He was discharged on May 17, 2022. His left leg is feeling better daily but he is still with pain and he is limping.    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  He was discharged with improved status.  His swelling has resolved.  The color of the leg looks better.  He is a smoker.  He is on therapy for hypertension and palpitations.    He has seen Hematology.  He is on it Eliquis starting tomorrow.  He has been on Lovenox through today.  He is not ready to go back to work, he works offshore in the oil business.  He has not had difficulties with palpitations or blood pressure recently.  He has normal renal function and he is not anemic.  Reportedly, his father had problems with DVT.      Review of Systems   Constitutional: Negative for chills, decreased appetite, diaphoresis, fever, malaise/fatigue, night sweats, weight gain and weight loss.   HENT: Negative for congestion, ear discharge, ear pain, hearing loss, hoarse voice, nosebleeds, odynophagia, sore throat, stridor and tinnitus.    Eyes: Negative for blurred vision, discharge,  double vision, pain, photophobia, redness, vision loss in left eye, vision loss in right eye, visual disturbance and visual halos.   Cardiovascular: Positive for leg swelling. Negative for chest pain, claudication, cyanosis, dyspnea on exertion, irregular heartbeat, near-syncope, orthopnea, palpitations, paroxysmal nocturnal dyspnea and syncope.   Respiratory: Positive for cough. Negative for hemoptysis, shortness of breath, sleep disturbances due to breathing, snoring, sputum production and wheezing.    Endocrine: Negative for cold intolerance, heat intolerance, polydipsia, polyphagia and polyuria.   Hematologic/Lymphatic: Negative for adenopathy and bleeding problem. Does not bruise/bleed easily.   Skin: Negative for color change, dry skin, flushing, itching, nail changes, poor wound healing, rash, skin cancer, suspicious lesions and unusual hair distribution.   Musculoskeletal: Negative for arthritis, back pain, falls, gout, joint pain, joint swelling, muscle cramps, muscle weakness, myalgias, neck pain and stiffness.   Gastrointestinal: Negative for bloating, abdominal pain, anorexia, change in bowel habit, bowel incontinence, constipation, diarrhea, dysphagia, excessive appetite, flatus, heartburn, hematemesis, hematochezia, hemorrhoids, jaundice, melena, nausea and vomiting.   Genitourinary: Negative for bladder incontinence, decreased libido, dysuria, flank pain, frequency, genital sores, hematuria, hesitancy, incomplete emptying, nocturia and urgency.   Neurological: Negative for aphonia, brief paralysis, difficulty with concentration, disturbances in coordination, excessive daytime sleepiness, dizziness, focal weakness, headaches, light-headedness, loss of balance, numbness, paresthesias, seizures, sensory change, tremors, vertigo and weakness.   Psychiatric/Behavioral: Negative for altered mental status, depression, hallucinations, memory loss, substance abuse, suicidal ideas and thoughts of violence. The  "patient does not have insomnia and is not nervous/anxious.    Allergic/Immunologic: Negative for hives and persistent infections.        Objective:       Vitals:    05/23/22 1327   BP: 114/79   Pulse: 84   SpO2: 96%   Weight: 109.7 kg (241 lb 12.8 oz)   Height: 5' 8" (1.727 m)    Physical Exam  Constitutional:       General: He is not in acute distress.     Appearance: He is well-developed. He is not diaphoretic.   HENT:      Head: Normocephalic and atraumatic.      Nose: Nose normal.   Eyes:      General: No scleral icterus.        Right eye: No discharge.      Conjunctiva/sclera: Conjunctivae normal.      Pupils: Pupils are equal, round, and reactive to light.   Neck:      Thyroid: No thyromegaly.      Vascular: No JVD.      Trachea: No tracheal deviation.   Cardiovascular:      Rate and Rhythm: Normal rate and regular rhythm.      Pulses:           Carotid pulses are 2+ on the right side and 2+ on the left side.       Radial pulses are 2+ on the right side and 2+ on the left side.        Dorsalis pedis pulses are 2+ on the right side and 2+ on the left side.        Posterior tibial pulses are 2+ on the right side and 2+ on the left side.      Heart sounds: Normal heart sounds. No murmur heard.    No friction rub. No gallop.   Pulmonary:      Effort: Pulmonary effort is normal. No respiratory distress.      Breath sounds: Normal breath sounds. No stridor. No wheezing or rales.   Chest:      Chest wall: No tenderness.   Abdominal:      General: Bowel sounds are normal. There is no distension.      Palpations: Abdomen is soft. There is no mass.      Tenderness: There is no abdominal tenderness. There is no guarding or rebound.   Musculoskeletal:         General: No tenderness. Normal range of motion.      Cervical back: Normal range of motion and neck supple.   Lymphadenopathy:      Cervical: No cervical adenopathy.   Skin:     General: Skin is warm and dry.      Coloration: Skin is not pale.      Findings: No " erythema or rash.   Neurological:      Mental Status: He is alert and oriented to person, place, and time.      Cranial Nerves: No cranial nerve deficit.      Coordination: Coordination normal.   Psychiatric:         Behavior: Behavior normal.         Thought Content: Thought content normal.         Judgment: Judgment normal.           Assessment:       1. Hyperlipidemia, unspecified hyperlipidemia type    2. Bilateral pulmonary embolism    3. Essential hypertension    4. Acute deep vein thrombosis (DVT) of distal vein of left lower extremity    5. Class 2 severe obesity due to excess calories with serious comorbidity and body mass index (BMI) of 36.0 to 36.9 in adult    6. Tobacco use      Results for orders placed or performed during the hospital encounter of 05/13/22   CBC auto differential   Result Value Ref Range    WBC 12.97 (H) 3.90 - 12.70 K/uL    RBC 5.07 4.60 - 6.20 M/uL    Hemoglobin 15.1 14.0 - 18.0 g/dL    Hematocrit 44.9 40.0 - 54.0 %    MCV 89 82 - 98 fL    MCH 29.8 27.0 - 31.0 pg    MCHC 33.6 32.0 - 36.0 g/dL    RDW 12.1 11.5 - 14.5 %    Platelets 133 (L) 150 - 450 K/uL    MPV 10.3 9.2 - 12.9 fL    Immature Granulocytes 0.6 (H) 0.0 - 0.5 %    Gran # (ANC) 8.9 (H) 1.8 - 7.7 K/uL    Immature Grans (Abs) 0.08 (H) 0.00 - 0.04 K/uL    Lymph # 2.5 1.0 - 4.8 K/uL    Mono # 1.1 (H) 0.3 - 1.0 K/uL    Eos # 0.3 0.0 - 0.5 K/uL    Baso # 0.10 0.00 - 0.20 K/uL    nRBC 0 0 /100 WBC    Gran % 68.5 38.0 - 73.0 %    Lymph % 19.5 18.0 - 48.0 %    Mono % 8.1 4.0 - 15.0 %    Eosinophil % 2.5 0.0 - 8.0 %    Basophil % 0.8 0.0 - 1.9 %    Differential Method Automated    Comprehensive metabolic panel   Result Value Ref Range    Sodium 139 136 - 145 mmol/L    Potassium 4.3 3.5 - 5.1 mmol/L    Chloride 104 95 - 110 mmol/L    CO2 25 23 - 29 mmol/L    Glucose 103 70 - 110 mg/dL    BUN 16 6 - 20 mg/dL    Creatinine 1.0 0.5 - 1.4 mg/dL    Calcium 9.1 8.7 - 10.5 mg/dL    Total Protein 7.6 6.0 - 8.4 g/dL    Albumin 3.8 3.5 - 5.2  g/dL    Total Bilirubin 0.7 0.1 - 1.0 mg/dL    Alkaline Phosphatase 66 55 - 135 U/L    AST 20 10 - 40 U/L    ALT 27 10 - 44 U/L    Anion Gap 10 8 - 16 mmol/L    eGFR if African American >60 >60 mL/min/1.73 m^2    eGFR if non African American >60 >60 mL/min/1.73 m^2   Troponin I   Result Value Ref Range    Troponin I <0.006 0.000 - 0.026 ng/mL   Brain natriuretic peptide   Result Value Ref Range    BNP <10 0 - 99 pg/mL   D dimer, quantitative   Result Value Ref Range    D-Dimer 12.72 (H) <0.50 mg/L FEU   Urinalysis, Reflex to Urine Culture Urine, Clean Catch    Specimen: Urine   Result Value Ref Range    Specimen UA Urine, Clean Catch     Color, UA Yellow Yellow, Straw, Nadia    Appearance, UA Clear Clear    pH, UA 6.0 5.0 - 8.0    Specific Gravity, UA 1.020 1.005 - 1.030    Protein, UA Negative Negative    Glucose, UA Negative Negative    Ketones, UA Negative Negative    Bilirubin (UA) Negative Negative    Occult Blood UA Negative Negative    Nitrite, UA Negative Negative    Urobilinogen, UA Negative <2.0 EU/dL    Leukocytes, UA Negative Negative   CBC Without Differential   Result Value Ref Range    WBC 14.48 (H) 3.90 - 12.70 K/uL    RBC 4.89 4.60 - 6.20 M/uL    Hemoglobin 14.1 14.0 - 18.0 g/dL    Hematocrit 44.1 40.0 - 54.0 %    MCV 90 82 - 98 fL    MCH 28.8 27.0 - 31.0 pg    MCHC 32.0 32.0 - 36.0 g/dL    RDW 12.2 11.5 - 14.5 %    Platelets 151 150 - 450 K/uL    MPV 9.8 9.2 - 12.9 fL   CBC Auto Differential   Result Value Ref Range    WBC 15.25 (H) 3.90 - 12.70 K/uL    RBC 4.94 4.60 - 6.20 M/uL    Hemoglobin 14.6 14.0 - 18.0 g/dL    Hematocrit 45.0 40.0 - 54.0 %    MCV 91 82 - 98 fL    MCH 29.6 27.0 - 31.0 pg    MCHC 32.4 32.0 - 36.0 g/dL    RDW 12.0 11.5 - 14.5 %    Platelets 157 150 - 450 K/uL    MPV 10.2 9.2 - 12.9 fL    Immature Granulocytes 0.7 (H) 0.0 - 0.5 %    Gran # (ANC) 10.6 (H) 1.8 - 7.7 K/uL    Immature Grans (Abs) 0.10 (H) 0.00 - 0.04 K/uL    Lymph # 2.7 1.0 - 4.8 K/uL    Mono # 1.7 (H) 0.3 - 1.0  K/uL    Eos # 0.1 0.0 - 0.5 K/uL    Baso # 0.07 0.00 - 0.20 K/uL    nRBC 0 0 /100 WBC    Gran % 69.1 38.0 - 73.0 %    Lymph % 17.9 (L) 18.0 - 48.0 %    Mono % 11.1 4.0 - 15.0 %    Eosinophil % 0.7 0.0 - 8.0 %    Basophil % 0.5 0.0 - 1.9 %    Differential Method Automated    Basic Metabolic Panel   Result Value Ref Range    Sodium 138 136 - 145 mmol/L    Potassium 4.2 3.5 - 5.1 mmol/L    Chloride 101 95 - 110 mmol/L    CO2 28 23 - 29 mmol/L    Glucose 127 (H) 70 - 110 mg/dL    BUN 12 6 - 20 mg/dL    Creatinine 0.9 0.5 - 1.4 mg/dL    Calcium 9.2 8.7 - 10.5 mg/dL    Anion Gap 9 8 - 16 mmol/L    eGFR if African American >60 >60 mL/min/1.73 m^2    eGFR if non African American >60 >60 mL/min/1.73 m^2   Lactic Acid, Plasma   Result Value Ref Range    Lactate (Lactic Acid) 1.0 0.5 - 2.2 mmol/L   Procalcitonin   Result Value Ref Range    Procalcitonin 0.16 <0.25 ng/mL   CK   Result Value Ref Range    CPK 86 20 - 200 U/L   CBC Auto Differential   Result Value Ref Range    WBC 15.67 (H) 3.90 - 12.70 K/uL    RBC 4.87 4.60 - 6.20 M/uL    Hemoglobin 13.9 (L) 14.0 - 18.0 g/dL    Hematocrit 43.6 40.0 - 54.0 %    MCV 90 82 - 98 fL    MCH 28.5 27.0 - 31.0 pg    MCHC 31.9 (L) 32.0 - 36.0 g/dL    RDW 12.0 11.5 - 14.5 %    Platelets 184 150 - 450 K/uL    MPV 10.3 9.2 - 12.9 fL    Immature Granulocytes 0.8 (H) 0.0 - 0.5 %    Gran # (ANC) 9.9 (H) 1.8 - 7.7 K/uL    Immature Grans (Abs) 0.12 (H) 0.00 - 0.04 K/uL    Lymph # 3.5 1.0 - 4.8 K/uL    Mono # 1.9 (H) 0.3 - 1.0 K/uL    Eos # 0.2 0.0 - 0.5 K/uL    Baso # 0.09 0.00 - 0.20 K/uL    nRBC 0 0 /100 WBC    Gran % 63.1 38.0 - 73.0 %    Lymph % 22.3 18.0 - 48.0 %    Mono % 12.0 4.0 - 15.0 %    Eosinophil % 1.2 0.0 - 8.0 %    Basophil % 0.6 0.0 - 1.9 %    Differential Method Automated    Basic Metabolic Panel   Result Value Ref Range    Sodium 139 136 - 145 mmol/L    Potassium 4.4 3.5 - 5.1 mmol/L    Chloride 101 95 - 110 mmol/L    CO2 29 23 - 29 mmol/L    Glucose 93 70 - 110 mg/dL    BUN 14 6  - 20 mg/dL    Creatinine 1.0 0.5 - 1.4 mg/dL    Calcium 9.4 8.7 - 10.5 mg/dL    Anion Gap 9 8 - 16 mmol/L    eGFR if African American >60 >60 mL/min/1.73 m^2    eGFR if non African American >60 >60 mL/min/1.73 m^2   POCT COVID-19 Rapid Screening   Result Value Ref Range    POC Rapid COVID Negative Negative     Acceptable Yes    POCT glucose   Result Value Ref Range    POC Glucose refused 70 - 110 MG/DL   Echo   Result Value Ref Range    BSA 2.35 m2    TDI SEPTAL 0.13 m/s    LV LATERAL E/E' RATIO 4.00 m/s    LV SEPTAL E/E' RATIO 5.85 m/s    LA WIDTH 3.48 cm    AORTIC VALVE CUSP SEPERATION 2.25 cm    TDI LATERAL 0.19 m/s    PV PEAK VELOCITY 0.99 cm/s    LVIDd 4.72 3.5 - 6.0 cm    IVS 1.08 0.6 - 1.1 cm    Posterior Wall 1.15 (A) 0.6 - 1.1 cm    Ao root annulus 3.26 cm    LVIDs 3.29 2.1 - 4.0 cm    FS 30 28 - 44 %    LA volume 52.34 cm3    STJ 2.75 cm    Ascending aorta 3.19 cm    LV mass 192.42 g    LA size 3.60 cm    RVDD 3.03 cm    TAPSE 2.33 cm    RV S' 16.31 cm/s    Left Ventricle Relative Wall Thickness 0.49 cm    AV mean gradient 5 mmHg    AV valve area 3.67 cm2    AV Velocity Ratio 0.84     AV index (prosthetic) 0.92     MV valve area p 1/2 method 4.42 cm2    E/A ratio 1.69     Mean e' 0.16 m/s    E wave deceleration time 171.84 msec    LVOT diameter 2.25 cm    LVOT area 4.0 cm2    LVOT peak anthony 1.23 m/s    LVOT peak VTI 22.02 cm    Ao peak anthony 1.47 m/s    Ao VTI 23.83 cm    LVOT stroke volume 87.51 cm3    AV peak gradient 9 mmHg    E/E' ratio 4.75 m/s    MV Peak E Anthony 0.76 m/s    TR Max Anthony 2.50 m/s    MV stenosis pressure 1/2 time 49.83 ms    MV Peak A Anthony 0.45 m/s    LV Systolic Volume 43.94 mL    LV Systolic Volume Index 19.4 mL/m2    LV Diastolic Volume 103.14 mL    LV Diastolic Volume Index 45.64 mL/m2    LA Volume Index 23.2 mL/m2    LV Mass Index 85 g/m2    RA Major Axis 4.90 cm    Left Atrium Minor Axis 4.77 cm    Left Atrium Major Axis 5.07 cm    Triscuspid Valve Regurgitation Peak  Gradient 25 mmHg    RA Width 3.18 cm    Right Atrial Pressure (from IVC) 3 mmHg    EF 60 %    TV rest pulmonary artery pressure 28 mmHg   POCT glucose   Result Value Ref Range    POCT Glucose 112 (H) 70 - 110 mg/dL   POCT glucose   Result Value Ref Range    POCT Glucose 109 70 - 110 mg/dL   ISTAT ACT-K   Result Value Ref Range    POC ACTIVATED CLOTTING TIME K 279 (H) 74 - 137 sec    Sample VENOUS     Site Other     Allens Test N/A    ISTAT ACT-K   Result Value Ref Range    POC ACTIVATED CLOTTING TIME K 238 (H) 74 - 137 sec    Sample VENOUS     Site Other     Allens Test N/A          Current Outpatient Medications:     [START ON 5/24/2022] apixaban (ELIQUIS) 5 mg Tab, Take 1 tablet (5 mg total) by mouth 2 (two) times daily., Disp: 60 tablet, Rfl: 11    enoxaparin (LOVENOX) 120 mg/0.8 mL Syrg, Inject 0.7 mLs (105 mg total) into the skin every 12 (twelve) hours for 7 days. Stop lovenox on 5/24, then start eliquis on 5/25., Disp: 11.2 mL, Rfl: 0    oxyCODONE (ROXICODONE) 10 mg Tab immediate release tablet, Take 1 tablet (10 mg total) by mouth every 8 (eight) hours as needed for Pain., Disp: 15 tablet, Rfl: 0    propranoloL (INDERAL) 40 MG tablet, Take 40 mg by mouth 2 (two) times daily., Disp: , Rfl:      Lab Results   Component Value Date    WBC 15.67 (H) 05/16/2022    RBC 4.87 05/16/2022    HGB 13.9 (L) 05/16/2022    HCT 43.6 05/16/2022    MCV 90 05/16/2022    MCH 28.5 05/16/2022    MCHC 31.9 (L) 05/16/2022    RDW 12.0 05/16/2022     05/16/2022    MPV 10.3 05/16/2022    GRAN 9.9 (H) 05/16/2022    GRAN 63.1 05/16/2022    LYMPH 3.5 05/16/2022    LYMPH 22.3 05/16/2022    MONO 1.9 (H) 05/16/2022    MONO 12.0 05/16/2022    EOS 0.2 05/16/2022    BASO 0.09 05/16/2022    EOSINOPHIL 1.2 05/16/2022    BASOPHIL 0.6 05/16/2022        CMP  Lab Results   Component Value Date     05/16/2022    K 4.4 05/16/2022     05/16/2022    CO2 29 05/16/2022    GLU 93 05/16/2022    BUN 14 05/16/2022    CREATININE 1.0  05/16/2022    CALCIUM 9.4 05/16/2022    PROT 7.6 05/13/2022    ALBUMIN 3.8 05/13/2022    BILITOT 0.7 05/13/2022    ALKPHOS 66 05/13/2022    AST 20 05/13/2022    ALT 27 05/13/2022    ANIONGAP 9 05/16/2022    ESTGFRAFRICA >60 05/16/2022    EGFRNONAA >60 05/16/2022        No results found for: LABBLOO, LABURIN, RESPIRATORYC, GSRESP         Results for orders placed or performed during the hospital encounter of 05/13/22   EKG 12-lead    Collection Time: 05/13/22  6:04 PM    Narrative    Test Reason : R00.0,    Vent. Rate : 095 BPM     Atrial Rate : 095 BPM     P-R Int : 136 ms          QRS Dur : 080 ms      QT Int : 330 ms       P-R-T Axes : 054 050 049 degrees     QTc Int : 414 ms    Normal sinus rhythm with sinus arrhythmia  Normal ECG  No previous ECGs available  Confirmed by Jose Bennett MD ([id number]) on 5/14/2022 12:46:13 PM    Referred By: SOURAV   SELF           Confirmed By:Jose Bennett MD                  Plan:       Problem List Items Addressed This Visit        Cardiac/Vascular    Essential hypertension (Chronic)     Blood pressure today normal.  He will continue propranolol.              Hematology    Bilateral pulmonary embolism     He does not have shortness of breath.  Lovenox/Eliquis on board.  Condition improved.  CTA reviewed, thrombi seen but it was a suboptimal study.           Acute deep vein thrombosis (DVT) of left lower extremity     Extensive thrombus load with thrombectomy successful.  He is still walking with a significant limp but there is no swelling and he states the pain is improving.  The procedure was completed May 16, 2022.]    He is completing Lovenox and will initiate Eliquis tomorrow.                Endocrine    Class 2 severe obesity with serious comorbidity in adult (Chronic)     Weight loss encouraged.              Other    Tobacco use (Chronic)     Cessation advised.             Other Visit Diagnoses     Hyperlipidemia, unspecified hyperlipidemia type    -  Primary    Relevant  Orders    Lipid Panel             A lipid panel is ordered.  He is not ready to go back to work because it is too close to his DVT and pulmonary embolus.  He works offshore oil employment.  He has followed up with Hematology and has follow-up visits with them.  Long-term anticoagulation advised.    I scheduled a follow-up visit in 4 months.           Rodney Frye MD  05/23/2022   2:07 PM

## 2022-05-23 NOTE — ASSESSMENT & PLAN NOTE
He does not have shortness of breath.  Lovenox/Eliquis on board.  Condition improved.  CTA reviewed, thrombi seen but it was a suboptimal study.

## 2022-05-23 NOTE — ASSESSMENT & PLAN NOTE
Extensive thrombus load with thrombectomy successful.  He is still walking with a significant limp but there is no swelling and he states the pain is improving.  The procedure was completed May 16, 2022.]    He is completing Lovenox and will initiate Eliquis tomorrow.

## 2022-05-24 ENCOUNTER — PATIENT MESSAGE (OUTPATIENT)
Dept: CARDIOLOGY | Facility: CLINIC | Age: 27
End: 2022-05-24
Payer: COMMERCIAL

## 2022-06-15 ENCOUNTER — TELEPHONE (OUTPATIENT)
Dept: HEMATOLOGY/ONCOLOGY | Facility: CLINIC | Age: 27
End: 2022-06-15
Payer: COMMERCIAL

## 2022-08-12 NOTE — PROGRESS NOTES
CC: h/o DVT, PE    HPI: , 26, is here for hematology consultation for history of DVT and PE.  He has  hypertension, ADHD, obesity, tobacco use, left LE DVT when he was 14. He presented to [facility] May 2022 for evaluation of left leg pain.  He was diagnosed with extensive DVT in left leg and bilateral pulmonary emboli at the time.  He had no chest pain or shortness of breath.  2D ECHO did not show any evidence of right heart strain.  He had severe pain in his left leg requiring iv morphine.  He underwent thrombectomy on 5/16/22. His DVT was unprovoked--no h/o using hormones, testosterone, recreational drugs. He does have history of smoking. He was active prior to the DVT diagnosis.  His father had blood clots and the patient had an upper extremity DVT as a child.  He underwent successful mechanical DVT thrombectomy with INARI clottriever was performed of the left common iliac, left external iliac, left common femoral, left femoral  veins with extirpition of matter on 5/16/22. Initially he was in severe pain at rest.He has remained afebrile and hemodynamically stable.  Because of signifcant clot burden, he was on one week of therapeutic Lovenox and then transitioned to Eliquis.      Past Medical History:   Diagnosis Date    Hypertension          Past Surgical History:   Procedure Laterality Date    CHOLECYSTECTOMY      THROMBECTOMY Left 5/16/2022    Procedure: THROMBECTOMY;  Surgeon: Jami Marshall MD;  Location: [facility] CATH LAB;  Service: Cardiology;  Laterality: Left;       Social History     Socioeconomic History    Marital status:    Tobacco Use    Smoking status: Current Every Day Smoker     Packs/day: 1.50    Smokeless tobacco: Never Used   Substance and Sexual Activity    Alcohol use: No    Drug use: Never       ,Review of patient's allergies indicates:  No Known Allergies        Current Outpatient Medications   Medication Sig    apixaban (ELIQUIS) 5 mg Tab Take 1 tablet (5 mg total)  by mouth 2 (two) times daily.    oxyCODONE (ROXICODONE) 10 mg Tab immediate release tablet Take 1 tablet (10 mg total) by mouth every 8 (eight) hours as needed for Pain.    propranoloL (INDERAL) 40 MG tablet Take 40 mg by mouth 2 (two) times daily.     No current facility-administered medications for this visit.         Review of Systems   Constitutional: Positive for malaise/fatigue. Negative for chills and weight loss.   HENT: Negative for congestion, ear pain, hearing loss, nosebleeds and tinnitus.    Eyes: Negative for double vision, photophobia, discharge and redness.   Respiratory: Negative for cough and sputum production.    Cardiovascular: Negative for chest pain, orthopnea, leg swelling and PND.   Gastrointestinal: Negative for constipation, diarrhea, heartburn and vomiting.   Genitourinary: Negative for dysuria and frequency.   Musculoskeletal: Negative for neck pain.   Neurological: Negative for sensory change, speech change and focal weakness.   Endo/Heme/Allergies: Negative for environmental allergies. Does not bruise/bleed easily.   Psychiatric/Behavioral: The patient is not nervous/anxious.          Vitals:    08/15/22 0835   BP: (!) 142/93   Pulse: 80   Resp: 16         Physical Exam  Constitutional:       Appearance: Normal appearance.   HENT:      Head: Normocephalic and atraumatic.   Eyes:      General: No scleral icterus.  Cardiovascular:      Rate and Rhythm: Normal rate and regular rhythm.   Pulmonary:      Effort: Pulmonary effort is normal. No respiratory distress.      Breath sounds: Normal breath sounds.   Abdominal:      General: There is no distension.      Palpations: There is no mass.      Tenderness: There is no abdominal tenderness.   Musculoskeletal:         General: No swelling.   Lymphadenopathy:      Cervical: No cervical adenopathy.   Skin:     Coloration: Skin is not jaundiced.   Neurological:      General: No focal deficit present.      Mental Status: He is alert and oriented  to person, place, and time.       5/13/22 CTA chest      COMPARISON:  None.     FINDINGS:  There is suboptimal timing of the intravenous bolus.  Despite this, there are filling defects seen in bilateral pulmonary arteries.  There is no aortic aneurysm or aortic dissection.  The pulmonary trunk is greater in caliber than the adjacent ascending thoracic aorta.     There is subtle ground-glass opacities seen bilaterally.  There is moderate linear atelectasis or scarring.     There is no evidence of mediastinal, hilar, or axillary adenopathy.     There is no pleural or pericardial effusion.     The heart size is within normal limits.     In the visualized upper abdomen, there is fatty infiltration of the liver.  The gallbladder surgically absent..     Impression:     Suboptimal timing of the intravenous bolus.  However, filling defects are seen bilaterally in the pulmonary arteries.  Findings suggest bilateral acute pulmonary emboli.     Pulmonary trunk greater in caliber than the corresponding ascending thoracic aorta.  Possibility of pulmonary hypertension should be considered.     Subtle bilateral ground-glass opacities.  Findings are nonspecific and may represent pneumonitis, crowding of the bronchovascular structures, atelectasis, or other etiology.  Moderate bilateral linear scarring or atelectasis.    5/13/22 US LOWER EXTREMITY VEINS LEFT     CLINICAL HISTORY:  Other specified soft tissue disorders     TECHNIQUE:  Duplex and color flow Doppler evaluation and graded compression of the left lower extremity veins was performed.     COMPARISON:  None     FINDINGS:  Left thigh veins: The common femoral, femoral, popliteal, and deep femoral veins are thrombosed.     Left calf veins: The visualized calf veins are also thrombosed.     Contralateral CFV: The contralateral (right) common femoral vein is patent and free of thrombus.     Miscellaneous: None     Impression:     Thrombosis of all of the deep veins in the left  lower extremity.    Component      Latest Ref Rng & Units 5/15/2022   WBC      3.90 - 12.70 K/uL 15.25 (H)   RBC      4.60 - 6.20 M/uL 4.94   Hemoglobin      14.0 - 18.0 g/dL 14.6   Hematocrit      40.0 - 54.0 % 45.0   MCV      82 - 98 fL 91   MCH      27.0 - 31.0 pg 29.6   MCHC      32.0 - 36.0 g/dL 32.4   RDW      11.5 - 14.5 % 12.0   Platelets      150 - 450 K/uL 157   MPV      9.2 - 12.9 fL 10.2   Immature Granulocytes      0.0 - 0.5 % 0.7 (H)   Gran # (ANC)      1.8 - 7.7 K/uL 10.6 (H)   Immature Grans (Abs)      0.00 - 0.04 K/uL 0.10 (H)   Lymph #      1.0 - 4.8 K/uL 2.7   Mono #      0.3 - 1.0 K/uL 1.7 (H)   Eos #      0.0 - 0.5 K/uL 0.1   Baso #      0.00 - 0.20 K/uL 0.07   nRBC      0 /100 WBC 0   Gran %      38.0 - 73.0 % 69.1   Lymph %      18.0 - 48.0 % 17.9 (L)   Mono %      4.0 - 15.0 % 11.1   Eosinophil %      0.0 - 8.0 % 0.7   Basophil %      0.0 - 1.9 % 0.5   Differential Method       Automated   Sodium      136 - 145 mmol/L 138   Potassium      3.5 - 5.1 mmol/L 4.2   Chloride      95 - 110 mmol/L 101   CO2      23 - 29 mmol/L 28   Glucose      70 - 110 mg/dL 127 (H)   BUN      6 - 20 mg/dL 12   Creatinine      0.5 - 1.4 mg/dL 0.9   Calcium      8.7 - 10.5 mg/dL 9.2   Anion Gap      8 - 16 mmol/L 9   eGFR if African American      >60 mL/min/1.73 m:2 >60   eGFR if non African American      >60 mL/min/1.73 m:2 >60     Assessment:    1. H/o DVT of left lower extremity  2. H0/ PE  3. Smoking      Plan:    1,2: He had extensive left LE DVT in may 2022. No clear provoking factors at the time except for smoking. No clear family history. He did have left LE DVT when he was 14, after he was on ECMO. He was on anti-coagulation for several months. He has no weight loss or change in appetite. His paternal grandmother  of unknown cancer when she was 50.  Maternal grand mother  when she was 69 from lung cancer.   He will have hypercoagulable work up today. He is on Apixaban at this time and if  anti-phospholipid antibodies ar enegative, he will be on long term Apixaban.     3. He is on wellbutrin.  Assistance with smoking cessation was offered, including:  []  Medications  [x]  Counseling  []  Printed Information on Smoking Cessation  []  Referral to a Smoking Cessation Program    Patient was counseled regarding smoking for 3-10 minutes.        BMT Chart Routing      Follow up with physician 1 year.   Follow up with [facility]    Infusion scheduling note    Injection scheduling note    Labs CBC, CMP, LDH, other and TSH   Lab interval:  Cbc, cmp, ldh, tsh, anti thrombin, protein c, protein s, anti cardiolipin , beta2 glycoprotein, TOM, MPN panel, bcr abl p210, bcr abl p190, factor V leiden, prothrombin geen mutation today; cbc, cmp, f/u in 1 yr   Imaging    Pharmacy appointment    Other referrals

## 2022-08-15 ENCOUNTER — LAB VISIT (OUTPATIENT)
Dept: LAB | Facility: HOSPITAL | Age: 27
End: 2022-08-15
Payer: COMMERCIAL

## 2022-08-15 ENCOUNTER — OFFICE VISIT (OUTPATIENT)
Dept: HEMATOLOGY/ONCOLOGY | Facility: CLINIC | Age: 27
End: 2022-08-15
Payer: COMMERCIAL

## 2022-08-15 VITALS
WEIGHT: 245.69 LBS | HEART RATE: 80 BPM | HEIGHT: 68 IN | BODY MASS INDEX: 37.23 KG/M2 | RESPIRATION RATE: 16 BRPM | OXYGEN SATURATION: 99 % | DIASTOLIC BLOOD PRESSURE: 93 MMHG | SYSTOLIC BLOOD PRESSURE: 142 MMHG

## 2022-08-15 DIAGNOSIS — D72.823 LEUKEMOID REACTION: ICD-10-CM

## 2022-08-15 DIAGNOSIS — Z72.0 TOBACCO USE: Chronic | ICD-10-CM

## 2022-08-15 DIAGNOSIS — I82.4Z2 ACUTE DEEP VEIN THROMBOSIS (DVT) OF DISTAL VEIN OF LEFT LOWER EXTREMITY: ICD-10-CM

## 2022-08-15 DIAGNOSIS — R53.82 CHRONIC FATIGUE: ICD-10-CM

## 2022-08-15 DIAGNOSIS — I26.99 BILATERAL PULMONARY EMBOLISM: Primary | ICD-10-CM

## 2022-08-15 DIAGNOSIS — I26.99 BILATERAL PULMONARY EMBOLISM: ICD-10-CM

## 2022-08-15 DIAGNOSIS — E66.01 CLASS 2 SEVERE OBESITY DUE TO EXCESS CALORIES WITH SERIOUS COMORBIDITY AND BODY MASS INDEX (BMI) OF 36.0 TO 36.9 IN ADULT: Chronic | ICD-10-CM

## 2022-08-15 LAB
AT III ACT/NOR PPP CHRO: 111 % (ref 83–118)
BASOPHILS # BLD AUTO: 0.06 K/UL (ref 0–0.2)
BASOPHILS NFR BLD: 0.8 % (ref 0–1.9)
DIFFERENTIAL METHOD: NORMAL
EOSINOPHIL # BLD AUTO: 0.2 K/UL (ref 0–0.5)
EOSINOPHIL NFR BLD: 2.1 % (ref 0–8)
ERYTHROCYTE [DISTWIDTH] IN BLOOD BY AUTOMATED COUNT: 13 % (ref 11.5–14.5)
HCT VFR BLD AUTO: 49.6 % (ref 40–54)
HGB BLD-MCNC: 16 G/DL (ref 14–18)
IMM GRANULOCYTES # BLD AUTO: 0.03 K/UL (ref 0–0.04)
IMM GRANULOCYTES NFR BLD AUTO: 0.4 % (ref 0–0.5)
LYMPHOCYTES # BLD AUTO: 3.3 K/UL (ref 1–4.8)
LYMPHOCYTES NFR BLD: 43.6 % (ref 18–48)
MCH RBC QN AUTO: 28.6 PG (ref 27–31)
MCHC RBC AUTO-ENTMCNC: 32.3 G/DL (ref 32–36)
MCV RBC AUTO: 89 FL (ref 82–98)
MONOCYTES # BLD AUTO: 0.6 K/UL (ref 0.3–1)
MONOCYTES NFR BLD: 7.6 % (ref 4–15)
NEUTROPHILS # BLD AUTO: 3.5 K/UL (ref 1.8–7.7)
NEUTROPHILS NFR BLD: 45.5 % (ref 38–73)
NRBC BLD-RTO: 0 /100 WBC
PLATELET # BLD AUTO: 225 K/UL (ref 150–450)
PMV BLD AUTO: 10.1 FL (ref 9.2–12.9)
RBC # BLD AUTO: 5.59 M/UL (ref 4.6–6.2)
TSH SERPL DL<=0.005 MIU/L-ACNC: 1.88 UIU/ML (ref 0.4–4)
WBC # BLD AUTO: 7.61 K/UL (ref 3.9–12.7)

## 2022-08-15 PROCEDURE — 3077F SYST BP >= 140 MM HG: CPT | Mod: CPTII,S$GLB,, | Performed by: INTERNAL MEDICINE

## 2022-08-15 PROCEDURE — 85300 ANTITHROMBIN III ACTIVITY: CPT | Performed by: INTERNAL MEDICINE

## 2022-08-15 PROCEDURE — 85306 CLOT INHIBIT PROT S FREE: CPT | Performed by: INTERNAL MEDICINE

## 2022-08-15 PROCEDURE — 81206 BCR/ABL1 GENE MAJOR BP: CPT | Performed by: INTERNAL MEDICINE

## 2022-08-15 PROCEDURE — 85302 CLOT INHIBIT PROT C ANTIGEN: CPT | Performed by: INTERNAL MEDICINE

## 2022-08-15 PROCEDURE — 3077F PR MOST RECENT SYSTOLIC BLOOD PRESSURE >= 140 MM HG: ICD-10-PCS | Mod: CPTII,S$GLB,, | Performed by: INTERNAL MEDICINE

## 2022-08-15 PROCEDURE — 85025 COMPLETE CBC W/AUTO DIFF WBC: CPT | Performed by: INTERNAL MEDICINE

## 2022-08-15 PROCEDURE — 3008F BODY MASS INDEX DOCD: CPT | Mod: CPTII,S$GLB,, | Performed by: INTERNAL MEDICINE

## 2022-08-15 PROCEDURE — 81240 F2 GENE: CPT | Performed by: INTERNAL MEDICINE

## 2022-08-15 PROCEDURE — 99999 PR PBB SHADOW E&M-EST. PATIENT-LVL III: CPT | Mod: PBBFAC,,, | Performed by: INTERNAL MEDICINE

## 2022-08-15 PROCEDURE — 1159F PR MEDICATION LIST DOCUMENTED IN MEDICAL RECORD: ICD-10-PCS | Mod: CPTII,S$GLB,, | Performed by: INTERNAL MEDICINE

## 2022-08-15 PROCEDURE — 86146 BETA-2 GLYCOPROTEIN ANTIBODY: CPT | Mod: 59 | Performed by: INTERNAL MEDICINE

## 2022-08-15 PROCEDURE — 3008F PR BODY MASS INDEX (BMI) DOCUMENTED: ICD-10-PCS | Mod: CPTII,S$GLB,, | Performed by: INTERNAL MEDICINE

## 2022-08-15 PROCEDURE — 85303 CLOT INHIBIT PROT C ACTIVITY: CPT | Performed by: INTERNAL MEDICINE

## 2022-08-15 PROCEDURE — 81339 MPL GENE SEQ ALYS EXON 10: CPT | Performed by: INTERNAL MEDICINE

## 2022-08-15 PROCEDURE — 3080F PR MOST RECENT DIASTOLIC BLOOD PRESSURE >= 90 MM HG: ICD-10-PCS | Mod: CPTII,S$GLB,, | Performed by: INTERNAL MEDICINE

## 2022-08-15 PROCEDURE — 81219 CALR GENE COM VARIANTS: CPT | Performed by: INTERNAL MEDICINE

## 2022-08-15 PROCEDURE — 84443 ASSAY THYROID STIM HORMONE: CPT | Performed by: INTERNAL MEDICINE

## 2022-08-15 PROCEDURE — 1159F MED LIST DOCD IN RCRD: CPT | Mod: CPTII,S$GLB,, | Performed by: INTERNAL MEDICINE

## 2022-08-15 PROCEDURE — 99205 OFFICE O/P NEW HI 60 MIN: CPT | Mod: S$GLB,,, | Performed by: INTERNAL MEDICINE

## 2022-08-15 PROCEDURE — 81207 BCR/ABL1 GENE MINOR BP: CPT | Performed by: INTERNAL MEDICINE

## 2022-08-15 PROCEDURE — 99205 PR OFFICE/OUTPT VISIT, NEW, LEVL V, 60-74 MIN: ICD-10-PCS | Mod: S$GLB,,, | Performed by: INTERNAL MEDICINE

## 2022-08-15 PROCEDURE — 81270 JAK2 GENE: CPT | Performed by: INTERNAL MEDICINE

## 2022-08-15 PROCEDURE — 85305 CLOT INHIBIT PROT S TOTAL: CPT | Performed by: INTERNAL MEDICINE

## 2022-08-15 PROCEDURE — 99999 PR PBB SHADOW E&M-EST. PATIENT-LVL III: ICD-10-PCS | Mod: PBBFAC,,, | Performed by: INTERNAL MEDICINE

## 2022-08-15 PROCEDURE — 81241 F5 GENE: CPT | Performed by: INTERNAL MEDICINE

## 2022-08-15 PROCEDURE — 86147 CARDIOLIPIN ANTIBODY EA IG: CPT | Mod: 59 | Performed by: INTERNAL MEDICINE

## 2022-08-15 PROCEDURE — 3080F DIAST BP >= 90 MM HG: CPT | Mod: CPTII,S$GLB,, | Performed by: INTERNAL MEDICINE

## 2022-08-15 PROCEDURE — 86038 ANTINUCLEAR ANTIBODIES: CPT | Performed by: INTERNAL MEDICINE

## 2022-08-16 LAB
ANA SER QL IF: NORMAL
PROT C ACT/NOR PPP CHRO: 136 % (ref 70–150)
PROT S ACT/NOR PPP: 140 % (ref 65–160)

## 2022-08-17 LAB
BCR/ABL RESULT, P190, QUANT, BLD: NORMAL
F2 C.20210G>A GENO BLD/T: NEGATIVE
F5 P.R506Q BLD/T QL: NEGATIVE
PATH REPORT.FINAL DX SPEC: NORMAL
PROT C AG ACT/NOR PPP IA: 141 % (ref 72–160)
SPECIMEN TYPE, P190, QUANT, BLD: NORMAL

## 2022-08-18 DIAGNOSIS — I26.99 BILATERAL PULMONARY EMBOLISM: Primary | ICD-10-CM

## 2022-08-18 LAB
B2 GLYCOPROT1 IGA SER QL: <9 SAU
B2 GLYCOPROT1 IGG SER QL: <9 SGU
B2 GLYCOPROT1 IGM SER QL: <9 SMU
BCR/ABL,P210 RESULT: NORMAL
CARDIOLIPIN IGG SER IA-ACNC: <9.4 GPL (ref 0–14.99)
CARDIOLIPIN IGM SER IA-ACNC: 22.6 MPL (ref 0–12.49)
PATH REPORT.FINAL DX SPEC: NORMAL
SPECIMEN TYPE: NORMAL

## 2022-08-19 LAB — PROT S FREE AG ACT/NOR PPP IA: 117 % (ref 57–171)

## 2022-08-22 LAB
MPNR  FINAL DIAGNOSIS: NORMAL
MPNR  SPECIMEN TYPE: NORMAL
MPNR RESULT: NORMAL

## 2022-11-16 ENCOUNTER — LAB VISIT (OUTPATIENT)
Dept: LAB | Facility: HOSPITAL | Age: 27
End: 2022-11-16
Payer: COMMERCIAL

## 2022-11-16 ENCOUNTER — OFFICE VISIT (OUTPATIENT)
Dept: HEMATOLOGY/ONCOLOGY | Facility: CLINIC | Age: 27
End: 2022-11-16
Payer: COMMERCIAL

## 2022-11-16 VITALS
SYSTOLIC BLOOD PRESSURE: 136 MMHG | OXYGEN SATURATION: 97 % | WEIGHT: 256.19 LBS | HEART RATE: 99 BPM | DIASTOLIC BLOOD PRESSURE: 82 MMHG | RESPIRATION RATE: 17 BRPM | HEIGHT: 68 IN | BODY MASS INDEX: 38.83 KG/M2 | TEMPERATURE: 98 F

## 2022-11-16 DIAGNOSIS — I10 ESSENTIAL HYPERTENSION: Primary | Chronic | ICD-10-CM

## 2022-11-16 DIAGNOSIS — I26.99 BILATERAL PULMONARY EMBOLISM: ICD-10-CM

## 2022-11-16 DIAGNOSIS — I82.5Y2 CHRONIC DEEP VEIN THROMBOSIS (DVT) OF PROXIMAL VEIN OF LEFT LOWER EXTREMITY: ICD-10-CM

## 2022-11-16 DIAGNOSIS — Z72.0 TOBACCO USE: Chronic | ICD-10-CM

## 2022-11-16 PROCEDURE — 1159F PR MEDICATION LIST DOCUMENTED IN MEDICAL RECORD: ICD-10-PCS | Mod: CPTII,S$GLB,, | Performed by: INTERNAL MEDICINE

## 2022-11-16 PROCEDURE — 3008F BODY MASS INDEX DOCD: CPT | Mod: CPTII,S$GLB,, | Performed by: INTERNAL MEDICINE

## 2022-11-16 PROCEDURE — 99999 PR PBB SHADOW E&M-EST. PATIENT-LVL III: ICD-10-PCS | Mod: PBBFAC,,, | Performed by: INTERNAL MEDICINE

## 2022-11-16 PROCEDURE — 3075F PR MOST RECENT SYSTOLIC BLOOD PRESS GE 130-139MM HG: ICD-10-PCS | Mod: CPTII,S$GLB,, | Performed by: INTERNAL MEDICINE

## 2022-11-16 PROCEDURE — 99215 PR OFFICE/OUTPT VISIT, EST, LEVL V, 40-54 MIN: ICD-10-PCS | Mod: S$GLB,,, | Performed by: INTERNAL MEDICINE

## 2022-11-16 PROCEDURE — 36415 COLL VENOUS BLD VENIPUNCTURE: CPT | Performed by: INTERNAL MEDICINE

## 2022-11-16 PROCEDURE — 3008F PR BODY MASS INDEX (BMI) DOCUMENTED: ICD-10-PCS | Mod: CPTII,S$GLB,, | Performed by: INTERNAL MEDICINE

## 2022-11-16 PROCEDURE — 86147 CARDIOLIPIN ANTIBODY EA IG: CPT | Performed by: INTERNAL MEDICINE

## 2022-11-16 PROCEDURE — 1159F MED LIST DOCD IN RCRD: CPT | Mod: CPTII,S$GLB,, | Performed by: INTERNAL MEDICINE

## 2022-11-16 PROCEDURE — 3079F DIAST BP 80-89 MM HG: CPT | Mod: CPTII,S$GLB,, | Performed by: INTERNAL MEDICINE

## 2022-11-16 PROCEDURE — 99999 PR PBB SHADOW E&M-EST. PATIENT-LVL III: CPT | Mod: PBBFAC,,, | Performed by: INTERNAL MEDICINE

## 2022-11-16 PROCEDURE — 99215 OFFICE O/P EST HI 40 MIN: CPT | Mod: S$GLB,,, | Performed by: INTERNAL MEDICINE

## 2022-11-16 PROCEDURE — 3079F PR MOST RECENT DIASTOLIC BLOOD PRESSURE 80-89 MM HG: ICD-10-PCS | Mod: CPTII,S$GLB,, | Performed by: INTERNAL MEDICINE

## 2022-11-16 PROCEDURE — 3075F SYST BP GE 130 - 139MM HG: CPT | Mod: CPTII,S$GLB,, | Performed by: INTERNAL MEDICINE

## 2022-11-16 RX ORDER — BUPROPION HYDROCHLORIDE 150 MG/1
150 TABLET ORAL EVERY MORNING
COMMUNITY
Start: 2022-08-21

## 2022-11-16 RX ORDER — RIVAROXABAN 20 MG/1
20 TABLET, FILM COATED ORAL DAILY
COMMUNITY
Start: 2022-10-28 | End: 2023-12-04

## 2022-11-16 RX ORDER — ZOLPIDEM TARTRATE 5 MG/1
5 TABLET ORAL NIGHTLY PRN
COMMUNITY
Start: 2022-07-14

## 2022-11-16 NOTE — PROGRESS NOTES
CC: h/o DVT, PE, follow up    HPI: , 27, is here for hematology follow up for history of DVT and PE.  He has  hypertension, ADHD, obesity, tobacco use, left LE DVT when he was 14. He presented to [facility] May 2022 for evaluation of left leg pain.  He was diagnosed with extensive DVT in left leg and bilateral pulmonary emboli at the time.  He had no chest pain or shortness of breath.  2D ECHO did not show any evidence of right heart strain.  He had severe pain in his left leg requiring iv morphine.  He underwent thrombectomy on 5/16/22. His DVT was unprovoked--no h/o using hormones, testosterone, recreational drugs. He does have history of smoking. He was active prior to the DVT diagnosis.  His father had blood clots and the patient had an upper extremity DVT as a child.  He underwent successful mechanical DVT thrombectomy with INARI clottriever was performed of the left common iliac, left external iliac, left common femoral, left femoral  veins with extirpition of matter on 5/16/22. Initially he was in severe pain at rest.He has remained afebrile and hemodynamically stable.  Because of signifcant clot burden, he was on one week of therapeutic Lovenox and then transitioned to Eliquis.      Interval History: He is here for followup visit. He is compliant with xarelto. No falls or bleeding since last visit.       Past Medical History:   Diagnosis Date    Hypertension          Past Surgical History:   Procedure Laterality Date    CHOLECYSTECTOMY      THROMBECTOMY Left 5/16/2022    Procedure: THROMBECTOMY;  Surgeon: Jami Marshall MD;  Location: [facility] CATH LAB;  Service: Cardiology;  Laterality: Left;       Social History     Socioeconomic History    Marital status:    Tobacco Use    Smoking status: Every Day     Packs/day: 1.50     Types: Cigarettes    Smokeless tobacco: Never   Substance and Sexual Activity    Alcohol use: No    Drug use: Never       ,Review of patient's allergies indicates:  No  Known Allergies        Current Outpatient Medications   Medication Sig    buPROPion (WELLBUTRIN XL) 150 MG TB24 tablet Take 150 mg by mouth every morning.    propranoloL (INDERAL) 40 MG tablet Take 40 mg by mouth 2 (two) times daily.    XARELTO 20 mg Tab Take 20 mg by mouth once daily.    zolpidem (AMBIEN) 5 MG Tab Take 5 mg by mouth nightly as needed.    apixaban (ELIQUIS) 5 mg Tab Take 1 tablet (5 mg total) by mouth 2 (two) times daily.    oxyCODONE (ROXICODONE) 10 mg Tab immediate release tablet Take 1 tablet (10 mg total) by mouth every 8 (eight) hours as needed for Pain.     No current facility-administered medications for this visit.         Review of Systems   Constitutional:  Positive for malaise/fatigue. Negative for chills and weight loss.   HENT:  Negative for congestion, ear pain, hearing loss, nosebleeds and tinnitus.    Eyes:  Negative for double vision, photophobia, discharge and redness.   Respiratory:  Negative for cough and sputum production.    Cardiovascular:  Negative for chest pain, orthopnea, leg swelling and PND.   Gastrointestinal:  Negative for constipation, diarrhea, heartburn and vomiting.   Genitourinary:  Negative for dysuria and frequency.   Musculoskeletal:  Negative for neck pain.   Neurological:  Negative for sensory change, speech change and focal weakness.   Endo/Heme/Allergies:  Negative for environmental allergies. Does not bruise/bleed easily.   Psychiatric/Behavioral:  The patient is not nervous/anxious.        Vitals:    11/16/22 1316   BP: 136/82   Pulse: 99   Resp: 17   Temp: 98.3 °F (36.8 °C)         Physical Exam  Constitutional:       Appearance: Normal appearance.   HENT:      Head: Normocephalic and atraumatic.   Eyes:      General: No scleral icterus.  Cardiovascular:      Rate and Rhythm: Normal rate and regular rhythm.   Pulmonary:      Effort: Pulmonary effort is normal. No respiratory distress.      Breath sounds: Normal breath sounds.   Abdominal:      General:  There is no distension.      Palpations: There is no mass.      Tenderness: There is no abdominal tenderness.   Musculoskeletal:         General: No swelling.   Lymphadenopathy:      Cervical: No cervical adenopathy.   Skin:     Coloration: Skin is not jaundiced.   Neurological:      General: No focal deficit present.      Mental Status: He is alert and oriented to person, place, and time.     5/13/22 CTA chest      COMPARISON:  None.     FINDINGS:  There is suboptimal timing of the intravenous bolus.  Despite this, there are filling defects seen in bilateral pulmonary arteries.  There is no aortic aneurysm or aortic dissection.  The pulmonary trunk is greater in caliber than the adjacent ascending thoracic aorta.     There is subtle ground-glass opacities seen bilaterally.  There is moderate linear atelectasis or scarring.     There is no evidence of mediastinal, hilar, or axillary adenopathy.     There is no pleural or pericardial effusion.     The heart size is within normal limits.     In the visualized upper abdomen, there is fatty infiltration of the liver.  The gallbladder surgically absent..     Impression:     Suboptimal timing of the intravenous bolus.  However, filling defects are seen bilaterally in the pulmonary arteries.  Findings suggest bilateral acute pulmonary emboli.     Pulmonary trunk greater in caliber than the corresponding ascending thoracic aorta.  Possibility of pulmonary hypertension should be considered.     Subtle bilateral ground-glass opacities.  Findings are nonspecific and may represent pneumonitis, crowding of the bronchovascular structures, atelectasis, or other etiology.  Moderate bilateral linear scarring or atelectasis.    5/13/22 US LOWER EXTREMITY VEINS LEFT     CLINICAL HISTORY:  Other specified soft tissue disorders     TECHNIQUE:  Duplex and color flow Doppler evaluation and graded compression of the left lower extremity veins was performed.     COMPARISON:  None      FINDINGS:  Left thigh veins: The common femoral, femoral, popliteal, and deep femoral veins are thrombosed.     Left calf veins: The visualized calf veins are also thrombosed.     Contralateral CFV: The contralateral (right) common femoral vein is patent and free of thrombus.     Miscellaneous: None     Impression:     Thrombosis of all of the deep veins in the left lower extremity.    Component      Latest Ref Rng & Units 8/15/2022   Beta-2 Glyco 1 IgG      <=20 SGU <9   Beta-2 Glyco 1 IgM      <=20 SMU <9   Beta-2 Glyco 1 IgA      <=20 GREGORIA <9   APA Isotype IgG      0.00 - 14.99 GPL <9.40   APA Isotype IgM      0.00 - 12.49 MPL 22.60 (H)   TOM Screen      Negative <1:80 Negative <1:80   Antithrombin III      83 - 118 % 111   Protein C Activity      70 - 150 % 136   Protein S Activity      65 - 160 % 140   Protein S Ag, Free      57 - 171 % 117   Protein C Antigen      72 - 160 % 141   Prothrombin (Factor II)        Negative   8/15/22 Peripheral blood, BCR/ABL1 mRNA level analysis (p210 fusion form): Negative. No BCR/ABL1 p210 mRNA transcripts were detected (%BCR/ABL1(p210):ABL1=0).   8/15/22 Peripheral blood,  BCR/ABL1  mRNA level analysis (p190 fusion form): Negative. No  BCR/ABL1  p190 mRNA transcripts were detected (%BCR/ABL1(p190):ABL1=0).     8/15/22 prothrombin gene mutation: The pathogenic c.*97G>A variant ([id number]) was NOT DETECTED.   8/15/22 factor V leiden : The pathogenic F5 Leiden variant (c.1691G>A) was NOT DETECTED.    8/15/22 MPN panel: negative    Assessment:    1. H/o DVT of left lower extremity  2. H0/ PE  3. Smoking      Plan:    1,2: He had extensive left LE DVT in may 2022. No clear provoking factors at the time except for smoking. No clear family history. He did have left LE DVT when he was 14, after he was on ECMO. He was on anti-coagulation for several months. He has no weight loss or change in appetite. His paternal grandmother  of unknown cancer when she was 50.  Maternal grand  mother  when she was 69 from lung cancer.   Hypercoagulable work up negative except for  mildly elevated anti-cardiolipin IgM in Aug 2022.   He is having repeat anti-cardiolipin checked today. If he has significant elevation, he will be switched to warfarin.    3. He is on wellbutrin.    Assistance with smoking cessation was offered, including:  []  Medications  [x]  Counseling  []  Printed Information on Smoking Cessation  []  Referral to a Smoking Cessation Program    Patient was counseled regarding smoking for 3-10 minutes.        BMT Chart Routing      Follow up with physician 6 months.   Follow up with I[facility]Y    Provider visit type    Infusion scheduling note    Injection scheduling note    Labs CBC and CMP   Lab interval:     Imaging    Pharmacy appointment    Other referrals

## 2022-11-21 LAB
CARDIOLIPIN IGG SER IA-ACNC: <9.4 GPL (ref 0–14.99)
CARDIOLIPIN IGM SER IA-ACNC: 11 MPL (ref 0–12.49)

## 2023-06-02 ENCOUNTER — LAB VISIT (OUTPATIENT)
Dept: LAB | Facility: HOSPITAL | Age: 28
End: 2023-06-02
Payer: COMMERCIAL

## 2023-06-02 ENCOUNTER — OFFICE VISIT (OUTPATIENT)
Dept: HEMATOLOGY/ONCOLOGY | Facility: CLINIC | Age: 28
End: 2023-06-02
Payer: COMMERCIAL

## 2023-06-02 VITALS
HEART RATE: 84 BPM | RESPIRATION RATE: 17 BRPM | BODY MASS INDEX: 38.32 KG/M2 | SYSTOLIC BLOOD PRESSURE: 117 MMHG | WEIGHT: 252.88 LBS | TEMPERATURE: 99 F | DIASTOLIC BLOOD PRESSURE: 76 MMHG | HEIGHT: 68 IN | OXYGEN SATURATION: 99 %

## 2023-06-02 DIAGNOSIS — I10 ESSENTIAL HYPERTENSION: Chronic | ICD-10-CM

## 2023-06-02 DIAGNOSIS — I82.5Y2 CHRONIC DEEP VEIN THROMBOSIS (DVT) OF PROXIMAL VEIN OF LEFT LOWER EXTREMITY: ICD-10-CM

## 2023-06-02 DIAGNOSIS — R05.9 COUGH IN ADULT: ICD-10-CM

## 2023-06-02 DIAGNOSIS — I82.5Y2 CHRONIC DEEP VEIN THROMBOSIS (DVT) OF PROXIMAL VEIN OF LEFT LOWER EXTREMITY: Primary | ICD-10-CM

## 2023-06-02 LAB
ALBUMIN SERPL BCP-MCNC: 3.9 G/DL (ref 3.5–5.2)
ALP SERPL-CCNC: 72 U/L (ref 55–135)
ALT SERPL W/O P-5'-P-CCNC: 31 U/L (ref 10–44)
ANION GAP SERPL CALC-SCNC: 7 MMOL/L (ref 8–16)
ANISOCYTOSIS BLD QL SMEAR: SLIGHT
AST SERPL-CCNC: 22 U/L (ref 10–40)
BASOPHILS # BLD AUTO: 0.08 K/UL (ref 0–0.2)
BASOPHILS NFR BLD: 0.7 % (ref 0–1.9)
BILIRUB SERPL-MCNC: 0.5 MG/DL (ref 0.1–1)
BUN SERPL-MCNC: 14 MG/DL (ref 6–20)
CALCIUM SERPL-MCNC: 9.8 MG/DL (ref 8.7–10.5)
CHLORIDE SERPL-SCNC: 105 MMOL/L (ref 95–110)
CO2 SERPL-SCNC: 27 MMOL/L (ref 23–29)
CREAT SERPL-MCNC: 0.9 MG/DL (ref 0.5–1.4)
DACRYOCYTES BLD QL SMEAR: NORMAL
DIFFERENTIAL METHOD: NORMAL
EOSINOPHIL # BLD AUTO: 0.2 K/UL (ref 0–0.5)
EOSINOPHIL NFR BLD: 2 % (ref 0–8)
ERYTHROCYTE [DISTWIDTH] IN BLOOD BY AUTOMATED COUNT: 12.5 % (ref 11.5–14.5)
EST. GFR  (NO RACE VARIABLE): >60 ML/MIN/1.73 M^2
GLUCOSE SERPL-MCNC: 82 MG/DL (ref 70–110)
HCT VFR BLD AUTO: 48.8 % (ref 40–54)
HGB BLD-MCNC: 15.7 G/DL (ref 14–18)
IMM GRANULOCYTES # BLD AUTO: 0.04 K/UL (ref 0–0.04)
IMM GRANULOCYTES NFR BLD AUTO: 0.4 % (ref 0–0.5)
LYMPHOCYTES # BLD AUTO: 4.3 K/UL (ref 1–4.8)
LYMPHOCYTES NFR BLD: 39.2 % (ref 18–48)
MCH RBC QN AUTO: 28.5 PG (ref 27–31)
MCHC RBC AUTO-ENTMCNC: 32.2 G/DL (ref 32–36)
MCV RBC AUTO: 89 FL (ref 82–98)
MONOCYTES # BLD AUTO: 0.9 K/UL (ref 0.3–1)
MONOCYTES NFR BLD: 8.5 % (ref 4–15)
NEUTROPHILS # BLD AUTO: 5.4 K/UL (ref 1.8–7.7)
NEUTROPHILS NFR BLD: 49.2 % (ref 38–73)
NRBC BLD-RTO: 0 /100 WBC
PLATELET # BLD AUTO: 232 K/UL (ref 150–450)
PMV BLD AUTO: 9.4 FL (ref 9.2–12.9)
POIKILOCYTOSIS BLD QL SMEAR: SLIGHT
POTASSIUM SERPL-SCNC: 4.7 MMOL/L (ref 3.5–5.1)
PROT SERPL-MCNC: 7.9 G/DL (ref 6–8.4)
RBC # BLD AUTO: 5.51 M/UL (ref 4.6–6.2)
SODIUM SERPL-SCNC: 139 MMOL/L (ref 136–145)
WBC # BLD AUTO: 11.03 K/UL (ref 3.9–12.7)

## 2023-06-02 PROCEDURE — 85025 COMPLETE CBC W/AUTO DIFF WBC: CPT | Performed by: INTERNAL MEDICINE

## 2023-06-02 PROCEDURE — 3074F SYST BP LT 130 MM HG: CPT | Mod: CPTII,S$GLB,, | Performed by: INTERNAL MEDICINE

## 2023-06-02 PROCEDURE — 99213 PR OFFICE/OUTPT VISIT, EST, LEVL III, 20-29 MIN: ICD-10-PCS | Mod: S$GLB,,, | Performed by: INTERNAL MEDICINE

## 2023-06-02 PROCEDURE — 3074F PR MOST RECENT SYSTOLIC BLOOD PRESSURE < 130 MM HG: ICD-10-PCS | Mod: CPTII,S$GLB,, | Performed by: INTERNAL MEDICINE

## 2023-06-02 PROCEDURE — 3078F DIAST BP <80 MM HG: CPT | Mod: CPTII,S$GLB,, | Performed by: INTERNAL MEDICINE

## 2023-06-02 PROCEDURE — 99213 OFFICE O/P EST LOW 20 MIN: CPT | Mod: S$GLB,,, | Performed by: INTERNAL MEDICINE

## 2023-06-02 PROCEDURE — 3008F PR BODY MASS INDEX (BMI) DOCUMENTED: ICD-10-PCS | Mod: CPTII,S$GLB,, | Performed by: INTERNAL MEDICINE

## 2023-06-02 PROCEDURE — 3008F BODY MASS INDEX DOCD: CPT | Mod: CPTII,S$GLB,, | Performed by: INTERNAL MEDICINE

## 2023-06-02 PROCEDURE — 99999 PR PBB SHADOW E&M-EST. PATIENT-LVL III: ICD-10-PCS | Mod: PBBFAC,,, | Performed by: INTERNAL MEDICINE

## 2023-06-02 PROCEDURE — 99999 PR PBB SHADOW E&M-EST. PATIENT-LVL III: CPT | Mod: PBBFAC,,, | Performed by: INTERNAL MEDICINE

## 2023-06-02 PROCEDURE — 80053 COMPREHEN METABOLIC PANEL: CPT | Performed by: INTERNAL MEDICINE

## 2023-06-02 PROCEDURE — 36415 COLL VENOUS BLD VENIPUNCTURE: CPT | Performed by: INTERNAL MEDICINE

## 2023-06-02 PROCEDURE — 3078F PR MOST RECENT DIASTOLIC BLOOD PRESSURE < 80 MM HG: ICD-10-PCS | Mod: CPTII,S$GLB,, | Performed by: INTERNAL MEDICINE

## 2023-06-02 RX ORDER — FINERENONE 20 MG/1
20 TABLET, FILM COATED ORAL DAILY
COMMUNITY

## 2023-06-02 NOTE — PROGRESS NOTES
CC: h/o DVT, PE, follow up    HPI: , 27, is here for hematology follow up for history of DVT and PE.  He has  hypertension, ADHD, obesity, tobacco use, left LE DVT when he was 14. He presented to [place] May 2022 for evaluation of left leg pain.  He was diagnosed with extensive DVT in left leg and bilateral pulmonary emboli at the time.  He had no chest pain or shortness of breath.  2D ECHO did not show any evidence of right heart strain.  He had severe pain in his left leg requiring iv morphine.  He underwent thrombectomy on 5/16/22. His DVT was unprovoked--no h/o using hormones, testosterone, recreational drugs. He does have history of smoking. He was active prior to the DVT diagnosis.  His father had blood clots and the patient had an upper extremity DVT as a child.  He underwent successful mechanical DVT thrombectomy with INARI clottriever was performed of the left common iliac, left external iliac, left common femoral, left femoral  veins with extirpition of matter on 5/16/22. Initially he was in severe pain at rest.He has remained afebrile and hemodynamically stable.  Because of signifcant clot burden, he was on one week of therapeutic Lovenox and then transitioned to Eliquis.      Interval History: He is here for followup visit. He is compliant with xarelto. No falls or bleeding since last visit. He has productive cough.       Past Medical History:   Diagnosis Date    Hypertension          Past Surgical History:   Procedure Laterality Date    CHOLECYSTECTOMY      THROMBECTOMY Left 5/16/2022    Procedure: THROMBECTOMY;  Surgeon: Jami Marshall MD;  Location: [facility] CATH LAB;  Service: Cardiology;  Laterality: Left;       Social History     Socioeconomic History    Marital status:    Tobacco Use    Smoking status: Every Day     Packs/day: 1.50     Types: Cigarettes    Smokeless tobacco: Never   Substance and Sexual Activity    Alcohol use: No    Drug use: Never       ,Review of patient's  allergies indicates:  No Known Allergies        Current Outpatient Medications   Medication Sig    buPROPion (WELLBUTRIN XL) 150 MG TB24 tablet Take 150 mg by mouth every morning.    oxyCODONE (ROXICODONE) 10 mg Tab immediate release tablet Take 1 tablet (10 mg total) by mouth every 8 (eight) hours as needed for Pain.    propranoloL (INDERAL) 40 MG tablet Take 40 mg by mouth 2 (two) times daily.    XARELTO 20 mg Tab Take 20 mg by mouth once daily.    zolpidem (AMBIEN) 5 MG Tab Take 5 mg by mouth nightly as needed.     No current facility-administered medications for this visit.         Review of Systems   Constitutional:  Positive for malaise/fatigue. Negative for chills and weight loss.   HENT:  Negative for congestion, ear pain, hearing loss, nosebleeds and tinnitus.    Eyes:  Negative for double vision, photophobia, discharge and redness.   Respiratory:  Positive for cough and sputum production.    Cardiovascular:  Negative for chest pain, orthopnea, leg swelling and PND.   Gastrointestinal:  Negative for constipation, diarrhea, heartburn and vomiting.   Genitourinary:  Negative for dysuria and frequency.   Musculoskeletal:  Negative for neck pain.   Neurological:  Negative for sensory change, speech change and focal weakness.   Endo/Heme/Allergies:  Negative for environmental allergies. Does not bruise/bleed easily.   Psychiatric/Behavioral:  The patient is not nervous/anxious.        Vitals:    06/02/23 1609   BP: 117/76   Pulse: 84   Resp: 17   Temp: 98.5 °F (36.9 °C)         Physical Exam  Constitutional:       Appearance: Normal appearance.   HENT:      Head: Normocephalic and atraumatic.   Eyes:      General: No scleral icterus.  Cardiovascular:      Rate and Rhythm: Normal rate and regular rhythm.   Pulmonary:      Effort: Pulmonary effort is normal. No respiratory distress.      Breath sounds: Normal breath sounds.   Abdominal:      General: There is no distension.      Palpations: There is no mass.       Tenderness: There is no abdominal tenderness.   Musculoskeletal:         General: No swelling.   Lymphadenopathy:      Cervical: No cervical adenopathy.   Skin:     Coloration: Skin is not jaundiced.   Neurological:      General: No focal deficit present.      Mental Status: He is alert and oriented to person, place, and time.     5/13/22 CTA chest      COMPARISON:  None.     FINDINGS:  There is suboptimal timing of the intravenous bolus.  Despite this, there are filling defects seen in bilateral pulmonary arteries.  There is no aortic aneurysm or aortic dissection.  The pulmonary trunk is greater in caliber than the adjacent ascending thoracic aorta.     There is subtle ground-glass opacities seen bilaterally.  There is moderate linear atelectasis or scarring.     There is no evidence of mediastinal, hilar, or axillary adenopathy.     There is no pleural or pericardial effusion.     The heart size is within normal limits.     In the visualized upper abdomen, there is fatty infiltration of the liver.  The gallbladder surgically absent..     Impression:     Suboptimal timing of the intravenous bolus.  However, filling defects are seen bilaterally in the pulmonary arteries.  Findings suggest bilateral acute pulmonary emboli.     Pulmonary trunk greater in caliber than the corresponding ascending thoracic aorta.  Possibility of pulmonary hypertension should be considered.     Subtle bilateral ground-glass opacities.  Findings are nonspecific and may represent pneumonitis, crowding of the bronchovascular structures, atelectasis, or other etiology.  Moderate bilateral linear scarring or atelectasis.    5/13/22 US LOWER EXTREMITY VEINS LEFT     CLINICAL HISTORY:  Other specified soft tissue disorders     TECHNIQUE:  Duplex and color flow Doppler evaluation and graded compression of the left lower extremity veins was performed.     COMPARISON:  None     FINDINGS:  Left thigh veins: The common femoral, femoral, popliteal,  and deep femoral veins are thrombosed.     Left calf veins: The visualized calf veins are also thrombosed.     Contralateral CFV: The contralateral (right) common femoral vein is patent and free of thrombus.     Miscellaneous: None     Impression:     Thrombosis of all of the deep veins in the left lower extremity.    Component      Latest Ref Rng & Units 8/15/2022   Beta-2 Glyco 1 IgG      <=20 SGU <9   Beta-2 Glyco 1 IgM      <=20 SMU <9   Beta-2 Glyco 1 IgA      <=20 GREGORIA <9   APA Isotype IgG      0.00 - 14.99 GPL <9.40   APA Isotype IgM      0.00 - 12.49 MPL 22.60 (H)   TOM Screen      Negative <1:80 Negative <1:80   Antithrombin III      83 - 118 % 111   Protein C Activity      70 - 150 % 136   Protein S Activity      65 - 160 % 140   Protein S Ag, Free      57 - 171 % 117   Protein C Antigen      72 - 160 % 141   Prothrombin (Factor II)        Negative   8/15/22 Peripheral blood, BCR/ABL1 mRNA level analysis (p210 fusion form): Negative. No BCR/ABL1 p210 mRNA transcripts were detected (%BCR/ABL1(p210):ABL1=0).   8/15/22 Peripheral blood,  BCR/ABL1  mRNA level analysis (p190 fusion form): Negative. No  BCR/ABL1  p190 mRNA transcripts were detected (%BCR/ABL1(p190):ABL1=0).     8/15/22 prothrombin gene mutation: The pathogenic c.*97G>A variant ([id number]) was NOT DETECTED.   8/15/22 factor V leiden : The pathogenic F5 Leiden variant (c.1691G>A) was NOT DETECTED.    8/15/22 MPN panel: negative    Component      Latest Ref Rng & Units 11/16/2022   APA Isotype IgG      0.00 - 14.99 GPL <9.40   APA Isotype IgM      0.00 - 12.49 MPL 11.00       Assessment:    1. H/o DVT of left lower extremity  2. H0/ PE  3. On long term anti-coagulation  4. Smoking      Plan:    1,2: He had extensive left LE DVT in May 2022. He is currently on xarelto. No clear provoking factors at the time except for smoking. No clear family history. He did have left LE DVT when he was 14, after he was on ECMO. He was on anti-coagulation for several  months. He has no weight loss or change in appetite. His paternal grandmother  of unknown cancer when she was 50.  Maternal grand mother  when she was 69 from lung cancer.   Hypercoagulable work up negative except for  mildly elevated anti-cardiolipin IgM in Aug 2022.   Repeat anti-cardiolipin IgG, IgM in normal range in 2022.     3. He has quit smoking at this time.       BMT Chart Routing      Follow up with physician 6 months.   Follow up with IZZY    Provider visit type    Infusion scheduling note    Injection scheduling note    Labs CBC and CMP   Scheduling:  Preferred lab:  Lab interval:  cbc, cmp today; cbc, cmp in 6 months   Imaging Chest x-ray   chest xray today or tomorrow   Pharmacy appointment    Other referrals

## 2023-06-05 ENCOUNTER — HOSPITAL ENCOUNTER (OUTPATIENT)
Dept: RADIOLOGY | Facility: HOSPITAL | Age: 28
Discharge: HOME | End: 2023-06-05
Attending: INTERNAL MEDICINE
Payer: COMMERCIAL

## 2023-06-05 DIAGNOSIS — R05.9 COUGH IN ADULT: ICD-10-CM

## 2023-06-05 DIAGNOSIS — I82.5Y2 CHRONIC DEEP VEIN THROMBOSIS (DVT) OF PROXIMAL VEIN OF LEFT LOWER EXTREMITY: ICD-10-CM

## 2023-06-05 PROCEDURE — 71046 X-RAY EXAM CHEST 2 VIEWS: CPT | Mod: TC,FY,PO

## 2023-06-05 PROCEDURE — 71046 XR CHEST PA AND LATERAL: ICD-10-PCS | Mod: 26,,, | Performed by: RADIOLOGY

## 2023-06-05 PROCEDURE — 71046 X-RAY EXAM CHEST 2 VIEWS: CPT | Mod: 26,,, | Performed by: RADIOLOGY

## 2023-11-30 NOTE — PROGRESS NOTES
CC: h/o DVT, PE, follow up    HPI: , 28, is here for hematology follow up for history of DVT and PE.  He has  hypertension, ADHD, obesity, tobacco use, left LE DVT when he was 14. He presented to [place] May 2022 for evaluation of left leg pain.  He was diagnosed with extensive DVT in left leg and bilateral pulmonary emboli at the time.  He had no chest pain or shortness of breath.  2D ECHO did not show any evidence of right heart strain.  He had severe pain in his left leg requiring iv morphine.  He underwent thrombectomy on 5/16/22. His DVT was unprovoked--no h/o using hormones, testosterone, recreational drugs. He does have history of smoking. He was active prior to the DVT diagnosis.  His father had blood clots and the patient had an upper extremity DVT as a child.  He underwent successful mechanical DVT thrombectomy with INARI clottriever was performed of the left common iliac, left external iliac, left common femoral, left femoral  veins with extirpition of matter on 5/16/22. Initially he was in severe pain at rest.He has remained afebrile and hemodynamically stable.  Because of signifcant clot burden, he was on one week of therapeutic Lovenox and then transitioned to Eliquis.      Interval History: He is here for followup visit. He is compliant with xarelto. No falls or bleeding since last visit. He has productive cough.         ,Review of patient's allergies indicates:  No Known Allergies        Current Outpatient Medications   Medication Sig    buPROPion (WELLBUTRIN XL) 150 MG TB24 tablet Take 150 mg by mouth every morning.    finerenone (KERENDIA) 20 mg Tab Take 20 mg by mouth Daily.    propranoloL (INDERAL) 40 MG tablet Take 40 mg by mouth 2 (two) times daily.    XARELTO 20 mg Tab Take 20 mg by mouth once daily.    zolpidem (AMBIEN) 5 MG Tab Take 5 mg by mouth nightly as needed.     No current facility-administered medications for this visit.         Review of Systems   Constitutional:   Positive for malaise/fatigue. Negative for chills and weight loss.   HENT:  Negative for congestion, ear pain, hearing loss, nosebleeds and tinnitus.    Eyes:  Negative for double vision, photophobia, discharge and redness.   Respiratory:  Positive for cough and sputum production.    Cardiovascular:  Negative for chest pain, orthopnea, leg swelling and PND.   Gastrointestinal:  Negative for constipation, diarrhea, heartburn and vomiting.   Genitourinary:  Negative for dysuria and frequency.   Musculoskeletal:  Negative for neck pain.   Neurological:  Negative for sensory change, speech change and focal weakness.   Endo/Heme/Allergies:  Negative for environmental allergies. Does not bruise/bleed easily.   Psychiatric/Behavioral:  The patient is not nervous/anxious.          Vitals:    12/04/23 1133   BP: (!) 138/94   Pulse: 80   Resp: 16   Temp: 98.4 °F (36.9 °C)             Physical Exam  Constitutional:       Appearance: Normal appearance.   HENT:      Head: Normocephalic and atraumatic.   Eyes:      General: No scleral icterus.  Cardiovascular:      Rate and Rhythm: Normal rate and regular rhythm.   Pulmonary:      Effort: Pulmonary effort is normal. No respiratory distress.      Breath sounds: Normal breath sounds.   Abdominal:      General: There is no distension.      Palpations: There is no mass.      Tenderness: There is no abdominal tenderness.   Musculoskeletal:         General: No swelling.   Lymphadenopathy:      Cervical: No cervical adenopathy.   Skin:     Coloration: Skin is not jaundiced.   Neurological:      General: No focal deficit present.      Mental Status: He is alert and oriented to person, place, and time.       5/13/22 CTA chest      COMPARISON:  None.     FINDINGS:  There is suboptimal timing of the intravenous bolus.  Despite this, there are filling defects seen in bilateral pulmonary arteries.  There is no aortic aneurysm or aortic dissection.  The pulmonary trunk is greater in caliber  than the adjacent ascending thoracic aorta.     There is subtle ground-glass opacities seen bilaterally.  There is moderate linear atelectasis or scarring.     There is no evidence of mediastinal, hilar, or axillary adenopathy.     There is no pleural or pericardial effusion.     The heart size is within normal limits.     In the visualized upper abdomen, there is fatty infiltration of the liver.  The gallbladder surgically absent..     Impression:     Suboptimal timing of the intravenous bolus.  However, filling defects are seen bilaterally in the pulmonary arteries.  Findings suggest bilateral acute pulmonary emboli.     Pulmonary trunk greater in caliber than the corresponding ascending thoracic aorta.  Possibility of pulmonary hypertension should be considered.     Subtle bilateral ground-glass opacities.  Findings are nonspecific and may represent pneumonitis, crowding of the bronchovascular structures, atelectasis, or other etiology.  Moderate bilateral linear scarring or atelectasis.    5/13/22 US LOWER EXTREMITY VEINS LEFT     CLINICAL HISTORY:  Other specified soft tissue disorders     TECHNIQUE:  Duplex and color flow Doppler evaluation and graded compression of the left lower extremity veins was performed.     COMPARISON:  None     FINDINGS:  Left thigh veins: The common femoral, femoral, popliteal, and deep femoral veins are thrombosed.     Left calf veins: The visualized calf veins are also thrombosed.     Contralateral CFV: The contralateral (right) common femoral vein is patent and free of thrombus.     Miscellaneous: None     Impression:     Thrombosis of all of the deep veins in the left lower extremity.    Component      Latest Ref Rng & Units 8/15/2022   Beta-2 Glyco 1 IgG      <=20 SGU <9   Beta-2 Glyco 1 IgM      <=20 SMU <9   Beta-2 Glyco 1 IgA      <=20 GREGORIA <9   APA Isotype IgG      0.00 - 14.99 GPL <9.40   APA Isotype IgM      0.00 - 12.49 MPL 22.60 (H)   TOM Screen      Negative <1:80  Negative <1:80   Antithrombin III      83 - 118 % 111   Protein C Activity      70 - 150 % 136   Protein S Activity      65 - 160 % 140   Protein S Ag, Free      57 - 171 % 117   Protein C Antigen      72 - 160 % 141   Prothrombin (Factor II)        Negative   8/15/22 Peripheral blood, BCR/ABL1 mRNA level analysis (p210 fusion form): Negative. No BCR/ABL1 p210 mRNA transcripts were detected (%BCR/ABL1(p210):ABL1=0).   8/15/22 Peripheral blood,  BCR/ABL1  mRNA level analysis (p190 fusion form): Negative. No  BCR/ABL1  p190 mRNA transcripts were detected (%BCR/ABL1(p190):ABL1=0).     8/15/22 prothrombin gene mutation: The pathogenic c.*97G>A variant ([id number]) was NOT DETECTED.   8/15/22 factor V leiden : The pathogenic F5 Leiden variant (c.1691G>A) was NOT DETECTED.    8/15/22 MPN panel: negative    Component      Latest Ref Rng & Units 11/16/2022   APA Isotype IgG      0.00 - 14.99 GPL <9.40   APA Isotype IgM      0.00 - 12.49 MPL 11.00     Component      Latest Ref Rng 12/4/2023   WBC      3.90 - 12.70 K/uL 10.02    RBC      4.60 - 6.20 M/uL 5.80    Hemoglobin      14.0 - 18.0 g/dL 16.3    Hematocrit      40.0 - 54.0 % 47.6    MCV      82 - 98 fL 82    MCH      27.0 - 31.0 pg 28.1    MCHC      32.0 - 36.0 g/dL 34.2    RDW      11.5 - 14.5 % 12.7    Platelet Count      150 - 450 K/uL 204    MPV      9.2 - 12.9 fL 9.6    Immature Granulocytes      0.0 - 0.5 % 0.4    Gran # (ANC)      1.8 - 7.7 K/uL 5.4    Immature Grans (Abs)      0.00 - 0.04 K/uL 0.04    Lymph #      1.0 - 4.8 K/uL 3.4    Mono #      0.3 - 1.0 K/uL 1.0    Eos #      0.0 - 0.5 K/uL 0.1    Baso #      0.00 - 0.20 K/uL 0.05    nRBC      0 /100 WBC 0    Gran %      38.0 - 73.0 % 54.0    Lymph %      18.0 - 48.0 % 34.2    Mono %      4.0 - 15.0 % 9.6    Eosinophil %      0.0 - 8.0 % 1.3    Basophil %      0.0 - 1.9 % 0.5    Differential Method Automated    Sodium      136 - 145 mmol/L 139    Potassium      3.5 - 5.1 mmol/L 4.5    Chloride      95 - 110  mmol/L 105    CO2      23 - 29 mmol/L 27    Glucose      70 - 110 mg/dL 94    BUN      6 - 20 mg/dL 11    Creatinine      0.5 - 1.4 mg/dL 1.0    Calcium      8.7 - 10.5 mg/dL 9.4    PROTEIN TOTAL      6.0 - 8.4 g/dL 7.8    Albumin      3.5 - 5.2 g/dL 3.9    BILIRUBIN TOTAL      0.1 - 1.0 mg/dL 0.7    ALP      55 - 135 U/L 69    AST      10 - 40 U/L 21    ALT      10 - 44 U/L 33    eGFR      >60 mL/min/1.73 m^2 >60.0    Anion Gap      8 - 16 mmol/L 7 (L)       Assessment:    1. H/o DVT of left lower extremity  2. H/o PE  3. On long term anti-coagulation  4. Smoking      Plan:    1,2: He had extensive left LE DVT in May 2022. He is currently on xarelto. No clear provoking factors at the time except for smoking. COVID 19 testing was negative at the time.   No clear family history. He did have left LE DVT when he was 14, after he was on ECMO. He was on anti-coagulation for several months. He has no weight loss or change in appetite. His paternal grandmother  of unknown cancer when she was 50.  Maternal grand mother  when she was 69 from lung cancer.   Hypercoagulable work up negative except for  mildly elevated anti-cardiolipin IgM in Aug 2022.   Repeat anti-cardiolipin IgG, IgM in normal range in 2022.   He will start lower dose xarelto (10mg) from 23.      3. He continues to smoke Counseling provided.     4. Morbid obesity    -discussed weight loss strategies, including GLP 1 inhibitor        BMT Chart Routing      Follow up with physician 1 year.   Follow up with IZZY    Provider visit type    Infusion scheduling note    Injection scheduling note    Labs CBC and CMP   Scheduling:  Preferred lab:  Lab interval:     Imaging    Pharmacy appointment    Other referrals

## 2023-12-04 ENCOUNTER — OFFICE VISIT (OUTPATIENT)
Dept: HEMATOLOGY/ONCOLOGY | Facility: CLINIC | Age: 28
End: 2023-12-04
Payer: COMMERCIAL

## 2023-12-04 ENCOUNTER — LAB VISIT (OUTPATIENT)
Dept: LAB | Facility: HOSPITAL | Age: 28
End: 2023-12-04
Attending: INTERNAL MEDICINE
Payer: COMMERCIAL

## 2023-12-04 VITALS
HEIGHT: 68 IN | DIASTOLIC BLOOD PRESSURE: 94 MMHG | BODY MASS INDEX: 38.65 KG/M2 | TEMPERATURE: 98 F | WEIGHT: 255.06 LBS | RESPIRATION RATE: 16 BRPM | SYSTOLIC BLOOD PRESSURE: 138 MMHG | HEART RATE: 80 BPM | OXYGEN SATURATION: 97 %

## 2023-12-04 DIAGNOSIS — I82.5Y2 CHRONIC DEEP VEIN THROMBOSIS (DVT) OF PROXIMAL VEIN OF LEFT LOWER EXTREMITY: ICD-10-CM

## 2023-12-04 DIAGNOSIS — I10 ESSENTIAL HYPERTENSION: Chronic | ICD-10-CM

## 2023-12-04 DIAGNOSIS — R53.82 CHRONIC FATIGUE: Primary | ICD-10-CM

## 2023-12-04 DIAGNOSIS — I26.99 BILATERAL PULMONARY EMBOLISM: ICD-10-CM

## 2023-12-04 DIAGNOSIS — Z72.0 TOBACCO USE: Chronic | ICD-10-CM

## 2023-12-04 DIAGNOSIS — E66.01 CLASS 2 SEVERE OBESITY DUE TO EXCESS CALORIES WITH SERIOUS COMORBIDITY AND BODY MASS INDEX (BMI) OF 36.0 TO 36.9 IN ADULT: ICD-10-CM

## 2023-12-04 LAB
ALBUMIN SERPL BCP-MCNC: 3.9 G/DL (ref 3.5–5.2)
ALP SERPL-CCNC: 69 U/L (ref 55–135)
ALT SERPL W/O P-5'-P-CCNC: 33 U/L (ref 10–44)
ANION GAP SERPL CALC-SCNC: 7 MMOL/L (ref 8–16)
AST SERPL-CCNC: 21 U/L (ref 10–40)
BASOPHILS # BLD AUTO: 0.05 K/UL (ref 0–0.2)
BASOPHILS NFR BLD: 0.5 % (ref 0–1.9)
BILIRUB SERPL-MCNC: 0.7 MG/DL (ref 0.1–1)
BUN SERPL-MCNC: 11 MG/DL (ref 6–20)
CALCIUM SERPL-MCNC: 9.4 MG/DL (ref 8.7–10.5)
CHLORIDE SERPL-SCNC: 105 MMOL/L (ref 95–110)
CO2 SERPL-SCNC: 27 MMOL/L (ref 23–29)
CREAT SERPL-MCNC: 1 MG/DL (ref 0.5–1.4)
DIFFERENTIAL METHOD: NORMAL
EOSINOPHIL # BLD AUTO: 0.1 K/UL (ref 0–0.5)
EOSINOPHIL NFR BLD: 1.3 % (ref 0–8)
ERYTHROCYTE [DISTWIDTH] IN BLOOD BY AUTOMATED COUNT: 12.7 % (ref 11.5–14.5)
EST. GFR  (NO RACE VARIABLE): >60 ML/MIN/1.73 M^2
GLUCOSE SERPL-MCNC: 94 MG/DL (ref 70–110)
HCT VFR BLD AUTO: 47.6 % (ref 40–54)
HGB BLD-MCNC: 16.3 G/DL (ref 14–18)
IMM GRANULOCYTES # BLD AUTO: 0.04 K/UL (ref 0–0.04)
IMM GRANULOCYTES NFR BLD AUTO: 0.4 % (ref 0–0.5)
LYMPHOCYTES # BLD AUTO: 3.4 K/UL (ref 1–4.8)
LYMPHOCYTES NFR BLD: 34.2 % (ref 18–48)
MCH RBC QN AUTO: 28.1 PG (ref 27–31)
MCHC RBC AUTO-ENTMCNC: 34.2 G/DL (ref 32–36)
MCV RBC AUTO: 82 FL (ref 82–98)
MONOCYTES # BLD AUTO: 1 K/UL (ref 0.3–1)
MONOCYTES NFR BLD: 9.6 % (ref 4–15)
NEUTROPHILS # BLD AUTO: 5.4 K/UL (ref 1.8–7.7)
NEUTROPHILS NFR BLD: 54 % (ref 38–73)
NRBC BLD-RTO: 0 /100 WBC
PLATELET # BLD AUTO: 204 K/UL (ref 150–450)
PMV BLD AUTO: 9.6 FL (ref 9.2–12.9)
POTASSIUM SERPL-SCNC: 4.5 MMOL/L (ref 3.5–5.1)
PROT SERPL-MCNC: 7.8 G/DL (ref 6–8.4)
RBC # BLD AUTO: 5.8 M/UL (ref 4.6–6.2)
SODIUM SERPL-SCNC: 139 MMOL/L (ref 136–145)
WBC # BLD AUTO: 10.02 K/UL (ref 3.9–12.7)

## 2023-12-04 PROCEDURE — 99999 PR PBB SHADOW E&M-EST. PATIENT-LVL III: CPT | Mod: PBBFAC,,, | Performed by: INTERNAL MEDICINE

## 2023-12-04 PROCEDURE — 99999 PR PBB SHADOW E&M-EST. PATIENT-LVL III: ICD-10-PCS | Mod: PBBFAC,,, | Performed by: INTERNAL MEDICINE

## 2023-12-04 PROCEDURE — 36415 COLL VENOUS BLD VENIPUNCTURE: CPT | Performed by: INTERNAL MEDICINE

## 2023-12-04 PROCEDURE — 3008F PR BODY MASS INDEX (BMI) DOCUMENTED: ICD-10-PCS | Mod: CPTII,S$GLB,, | Performed by: INTERNAL MEDICINE

## 2023-12-04 PROCEDURE — 3075F PR MOST RECENT SYSTOLIC BLOOD PRESS GE 130-139MM HG: ICD-10-PCS | Mod: CPTII,S$GLB,, | Performed by: INTERNAL MEDICINE

## 2023-12-04 PROCEDURE — 85025 COMPLETE CBC W/AUTO DIFF WBC: CPT | Performed by: INTERNAL MEDICINE

## 2023-12-04 PROCEDURE — 3075F SYST BP GE 130 - 139MM HG: CPT | Mod: CPTII,S$GLB,, | Performed by: INTERNAL MEDICINE

## 2023-12-04 PROCEDURE — 99213 PR OFFICE/OUTPT VISIT, EST, LEVL III, 20-29 MIN: ICD-10-PCS | Mod: S$GLB,,, | Performed by: INTERNAL MEDICINE

## 2023-12-04 PROCEDURE — 1159F MED LIST DOCD IN RCRD: CPT | Mod: CPTII,S$GLB,, | Performed by: INTERNAL MEDICINE

## 2023-12-04 PROCEDURE — 3008F BODY MASS INDEX DOCD: CPT | Mod: CPTII,S$GLB,, | Performed by: INTERNAL MEDICINE

## 2023-12-04 PROCEDURE — 1159F PR MEDICATION LIST DOCUMENTED IN MEDICAL RECORD: ICD-10-PCS | Mod: CPTII,S$GLB,, | Performed by: INTERNAL MEDICINE

## 2023-12-04 PROCEDURE — 3080F DIAST BP >= 90 MM HG: CPT | Mod: CPTII,S$GLB,, | Performed by: INTERNAL MEDICINE

## 2023-12-04 PROCEDURE — 3080F PR MOST RECENT DIASTOLIC BLOOD PRESSURE >= 90 MM HG: ICD-10-PCS | Mod: CPTII,S$GLB,, | Performed by: INTERNAL MEDICINE

## 2023-12-04 PROCEDURE — 99213 OFFICE O/P EST LOW 20 MIN: CPT | Mod: S$GLB,,, | Performed by: INTERNAL MEDICINE

## 2023-12-04 PROCEDURE — 80053 COMPREHEN METABOLIC PANEL: CPT | Performed by: INTERNAL MEDICINE

## 2024-12-04 ENCOUNTER — TELEPHONE (OUTPATIENT)
Dept: HEMATOLOGY/ONCOLOGY | Facility: CLINIC | Age: 29
End: 2024-12-04
Payer: COMMERCIAL

## 2024-12-04 NOTE — TELEPHONE ENCOUNTER
----- Message from Wisam sent at 12/2/2024  2:24 PM CST -----  Name Of Caller:  Colten        Provider Name: Shantell Sloan MD        Does patient feel the need to be seen today? no        Relationship to the Pt?: patient        Contact Preference?: [phone]        What is the nature of the call?:  Patient states that he would like to speak with someone in the office in regards to rescheduling his appointment with Dr Sloan that he cancelled on 12- at 11:40am.

## 2024-12-10 DIAGNOSIS — R53.82 CHRONIC FATIGUE: Primary | ICD-10-CM

## 2024-12-10 DIAGNOSIS — I82.5Y2 CHRONIC DEEP VEIN THROMBOSIS (DVT) OF PROXIMAL VEIN OF LEFT LOWER EXTREMITY: ICD-10-CM

## 2024-12-12 ENCOUNTER — OFFICE VISIT (OUTPATIENT)
Dept: HEMATOLOGY/ONCOLOGY | Facility: CLINIC | Age: 29
End: 2024-12-12
Payer: COMMERCIAL

## 2024-12-12 ENCOUNTER — LAB VISIT (OUTPATIENT)
Dept: LAB | Facility: HOSPITAL | Age: 29
End: 2024-12-12
Attending: INTERNAL MEDICINE
Payer: COMMERCIAL

## 2024-12-12 VITALS
BODY MASS INDEX: 40.45 KG/M2 | HEART RATE: 105 BPM | HEIGHT: 68 IN | SYSTOLIC BLOOD PRESSURE: 140 MMHG | DIASTOLIC BLOOD PRESSURE: 89 MMHG | WEIGHT: 266.88 LBS | OXYGEN SATURATION: 95 % | TEMPERATURE: 98 F

## 2024-12-12 DIAGNOSIS — I82.5Y2 CHRONIC DEEP VEIN THROMBOSIS (DVT) OF PROXIMAL VEIN OF LEFT LOWER EXTREMITY: ICD-10-CM

## 2024-12-12 DIAGNOSIS — R53.82 CHRONIC FATIGUE: ICD-10-CM

## 2024-12-12 DIAGNOSIS — E66.813 CLASS 3 SEVERE OBESITY DUE TO EXCESS CALORIES WITH BODY MASS INDEX (BMI) OF 40.0 TO 44.9 IN ADULT, UNSPECIFIED WHETHER SERIOUS COMORBIDITY PRESENT: ICD-10-CM

## 2024-12-12 DIAGNOSIS — I26.99 BILATERAL PULMONARY EMBOLISM: ICD-10-CM

## 2024-12-12 DIAGNOSIS — Z72.0 TOBACCO USE: ICD-10-CM

## 2024-12-12 DIAGNOSIS — G47.9 SLEEP DISTURBANCE: ICD-10-CM

## 2024-12-12 DIAGNOSIS — I82.5Y2 CHRONIC DEEP VEIN THROMBOSIS (DVT) OF PROXIMAL VEIN OF LEFT LOWER EXTREMITY: Primary | ICD-10-CM

## 2024-12-12 DIAGNOSIS — E66.01 CLASS 3 SEVERE OBESITY DUE TO EXCESS CALORIES WITH BODY MASS INDEX (BMI) OF 40.0 TO 44.9 IN ADULT, UNSPECIFIED WHETHER SERIOUS COMORBIDITY PRESENT: ICD-10-CM

## 2024-12-12 LAB
ALBUMIN SERPL BCP-MCNC: 3.9 G/DL (ref 3.5–5.2)
ALP SERPL-CCNC: 73 U/L (ref 40–150)
ALT SERPL W/O P-5'-P-CCNC: 61 U/L (ref 10–44)
ANION GAP SERPL CALC-SCNC: 8 MMOL/L (ref 8–16)
AST SERPL-CCNC: 28 U/L (ref 10–40)
BASOPHILS # BLD AUTO: 0.09 K/UL (ref 0–0.2)
BASOPHILS NFR BLD: 0.9 % (ref 0–1.9)
BILIRUB SERPL-MCNC: 0.5 MG/DL (ref 0.1–1)
BUN SERPL-MCNC: 10 MG/DL (ref 6–20)
CALCIUM SERPL-MCNC: 9.4 MG/DL (ref 8.7–10.5)
CHLORIDE SERPL-SCNC: 104 MMOL/L (ref 95–110)
CO2 SERPL-SCNC: 28 MMOL/L (ref 23–29)
CREAT SERPL-MCNC: 0.9 MG/DL (ref 0.5–1.4)
DIFFERENTIAL METHOD BLD: ABNORMAL
EOSINOPHIL # BLD AUTO: 0.1 K/UL (ref 0–0.5)
EOSINOPHIL NFR BLD: 1.2 % (ref 0–8)
ERYTHROCYTE [DISTWIDTH] IN BLOOD BY AUTOMATED COUNT: 12.2 % (ref 11.5–14.5)
EST. GFR  (NO RACE VARIABLE): >60 ML/MIN/1.73 M^2
GLUCOSE SERPL-MCNC: 113 MG/DL (ref 70–110)
HCT VFR BLD AUTO: 48.9 % (ref 40–54)
HGB BLD-MCNC: 17 G/DL (ref 14–18)
IMM GRANULOCYTES # BLD AUTO: 0.07 K/UL (ref 0–0.04)
IMM GRANULOCYTES NFR BLD AUTO: 0.7 % (ref 0–0.5)
LYMPHOCYTES # BLD AUTO: 3.4 K/UL (ref 1–4.8)
LYMPHOCYTES NFR BLD: 34 % (ref 18–48)
MCH RBC QN AUTO: 30.6 PG (ref 27–31)
MCHC RBC AUTO-ENTMCNC: 34.8 G/DL (ref 32–36)
MCV RBC AUTO: 88 FL (ref 82–98)
MONOCYTES # BLD AUTO: 0.9 K/UL (ref 0.3–1)
MONOCYTES NFR BLD: 9.4 % (ref 4–15)
NEUTROPHILS # BLD AUTO: 5.4 K/UL (ref 1.8–7.7)
NEUTROPHILS NFR BLD: 53.8 % (ref 38–73)
NRBC BLD-RTO: 0 /100 WBC
PLATELET # BLD AUTO: 230 K/UL (ref 150–450)
PMV BLD AUTO: 9.8 FL (ref 9.2–12.9)
POTASSIUM SERPL-SCNC: 4.7 MMOL/L (ref 3.5–5.1)
PROT SERPL-MCNC: 7.9 G/DL (ref 6–8.4)
RBC # BLD AUTO: 5.55 M/UL (ref 4.6–6.2)
SODIUM SERPL-SCNC: 140 MMOL/L (ref 136–145)
WBC # BLD AUTO: 10.04 K/UL (ref 3.9–12.7)

## 2024-12-12 PROCEDURE — 99999 PR PBB SHADOW E&M-EST. PATIENT-LVL III: CPT | Mod: PBBFAC,,,

## 2024-12-12 PROCEDURE — 36415 COLL VENOUS BLD VENIPUNCTURE: CPT

## 2024-12-12 PROCEDURE — 80053 COMPREHEN METABOLIC PANEL: CPT

## 2024-12-12 PROCEDURE — 85025 COMPLETE CBC W/AUTO DIFF WBC: CPT

## 2024-12-12 RX ORDER — ZOLPIDEM TARTRATE 10 MG/1
TABLET ORAL
COMMUNITY
Start: 2024-12-02

## 2024-12-12 NOTE — PROGRESS NOTES
Subjective:      Patient ID: Colten Serrato is a 29 y.o. male.    Chief Complaint: DVT/ PE follow-up    HPI:   , 29, is here for hematology follow up for history of DVT and PE.  He has  hypertension, ADHD, obesity, tobacco use, left LE DVT when he was 14. He presented to [facility] May 2022 for evaluation of left leg pain.  He was diagnosed with extensive DVT in left leg and bilateral pulmonary emboli at the time.  He had no chest pain or shortness of breath.  2D ECHO did not show any evidence of right heart strain.  He had severe pain in his left leg requiring iv morphine.  He underwent thrombectomy on 5/16/22. His DVT was unprovoked--no h/o using hormones, testosterone, recreational drugs. He does have history of smoking. He was active prior to the DVT diagnosis.  His father had blood clots and the patient had an upper extremity DVT as a child.  He underwent successful mechanical DVT thrombectomy with INARI clottriever was performed of the left common iliac, left external iliac, left common femoral, left femoral  veins with extirpition of matter on 5/16/22. Initially he was in severe pain at rest.He has remained afebrile and hemodynamically stable.  Because of signifcant clot burden, he was on one week of therapeutic Lovenox and then transitioned to Eliquis.       Interval History  12/4/2023: He is here for followup visit. He is compliant with xarelto. No falls or bleeding since last visit. He has productive cough.     Interval History  12/12/2024:  Mr. Serrato returns to clinic today for follow-up DVT and bilateral PE. He is new to me after previously being followed by Dr. Sloan. Hypercoagulable labs performed in 2022 were negative therefore he remains on Xarelto 10 mg daily. Endorses compliance with daily Xarelto, denies any mucosal bleeding, hematuria, melena, hematochezia.     He endorses chronic fatigue, states he has difficulty maintaining sleep with frequent nighttime waking despite taking  Ambien nightly.     Still smoking 1.5 packs per day, not ready to quit. He works offshore as a .    I have reviewed all of the patient's relevant lab work available in the medical record and have utilized this in my evaluation and management recommendations today.      Social History     Socioeconomic History    Marital status:    Tobacco Use    Smoking status: Every Day     Current packs/day: 1.50     Types: Cigarettes    Smokeless tobacco: Never   Substance and Sexual Activity    Alcohol use: No    Drug use: Never       No family history on file.    Past Surgical History:   Procedure Laterality Date    CHOLECYSTECTOMY      THROMBECTOMY Left 2022    Procedure: THROMBECTOMY;  Surgeon: Jami Marshall MD;  Location: [facility] CATH LAB;  Service: Cardiology;  Laterality: Left;       Past Medical History:   Diagnosis Date    Hypertension        Review of Systems   Constitutional:  Positive for fatigue. Negative for appetite change, chills, diaphoresis, fever and unexpected weight change.   HENT:  Negative for nosebleeds.    Cardiovascular:  Positive for palpitations. Negative for chest pain.        Feels it's related to anxiety   Gastrointestinal:  Negative for abdominal pain, anal bleeding, blood in stool, constipation and diarrhea.   Genitourinary:  Negative for hematuria.   Musculoskeletal:  Positive for arthralgias and myalgias.   Skin:  Negative for pallor.   Neurological:  Negative for dizziness and light-headedness.   Hematological:  Negative for adenopathy. Does not bruise/bleed easily.          Medication List with Changes/Refills   Current Medications    BUPROPION (WELLBUTRIN XL) 150 MG TB24 TABLET    Take 150 mg by mouth every morning.    FINERENONE (KERENDIA) 20 MG TAB    Take 20 mg by mouth Daily.    PROPRANOLOL (INDERAL) 40 MG TABLET    Take 40 mg by mouth 2 (two) times daily.    ZOLPIDEM (AMBIEN) 10 MG TAB    SMARTSI.0 Tablet(s) By Mouth Every Night PRN   Changed and/or  Refilled Medications    Modified Medication Previous Medication    RIVAROXABAN (XARELTO) 10 MG TAB rivaroxaban (XARELTO) 10 mg Tab       Take 1 tablet (10 mg total) by mouth daily with dinner or evening meal.    Take 1 tablet (10 mg total) by mouth daily with dinner or evening meal.   Discontinued Medications    ZOLPIDEM (AMBIEN) 5 MG TAB    Take 5 mg by mouth nightly as needed.        Objective:     Vitals:    12/12/24 1048   BP: (!) 140/89   Pulse: 105   Temp: 98.4 °F (36.9 °C)       Physical Exam  Constitutional:       Appearance: He is obese.   HENT:      Head: Normocephalic.      Right Ear: External ear normal.      Left Ear: External ear normal.      Nose: Nose normal.   Cardiovascular:      Rate and Rhythm: Regular rhythm. Tachycardia present.      Heart sounds: Normal heart sounds.   Pulmonary:      Effort: Pulmonary effort is normal.      Breath sounds: Normal breath sounds.   Musculoskeletal:         General: Normal range of motion.      Cervical back: Normal range of motion.   Skin:     General: Skin is warm and dry.      Findings: No bruising.   Neurological:      Mental Status: He is alert and oriented to person, place, and time.   Psychiatric:         Mood and Affect: Mood normal.         Behavior: Behavior normal.         Thought Content: Thought content normal.         Judgment: Judgment normal.       Assessment:     Problem List Items Addressed This Visit          Hematology    Bilateral pulmonary embolism    Relevant Medications    rivaroxaban (XARELTO) 10 mg Tab    Other Relevant Orders    CBC w/ DIFF    CMP    Chronic deep vein thrombosis (DVT) of proximal vein of left lower extremity - Primary    Relevant Medications    rivaroxaban (XARELTO) 10 mg Tab    Other Relevant Orders    CBC w/ DIFF    CMP       Endocrine    Class 3 severe obesity due to excess calories with body mass index (BMI) of 40.0 to 44.9 in adult       Other    Tobacco use (Chronic)    Chronic fatigue    Relevant Orders     "Ambulatory referral/consult to Sleep Disorders    Sleep disturbance    Relevant Orders    Ambulatory referral/consult to Sleep Disorders       Lab Results   Component Value Date    WBC 10.04 12/12/2024    RBC 5.55 12/12/2024    HGB 17.0 12/12/2024    HCT 48.9 12/12/2024    MCV 88 12/12/2024    MCH 30.6 12/12/2024    MCHC 34.8 12/12/2024    RDW 12.2 12/12/2024     12/12/2024    MPV 9.8 12/12/2024    GRAN 5.4 12/12/2024    GRAN 53.8 12/12/2024    LYMPH 3.4 12/12/2024    LYMPH 34.0 12/12/2024    MONO 0.9 12/12/2024    MONO 9.4 12/12/2024    EOS 0.1 12/12/2024    BASO 0.09 12/12/2024    EOSINOPHIL 1.2 12/12/2024    BASOPHIL 0.9 12/12/2024      Lab Results   Component Value Date     12/12/2024    K 4.7 12/12/2024     12/12/2024    CO2 28 12/12/2024    BUN 10 12/12/2024    CREATININE 0.9 12/12/2024    CALCIUM 9.4 12/12/2024    ANIONGAP 8 12/12/2024    ESTGFRAFRICA >60 05/16/2022    EGFRNONAA >60 05/16/2022     Lab Results   Component Value Date    ALT 61 (H) 12/12/2024    AST 28 12/12/2024    ALKPHOS 73 12/12/2024    BILITOT 0.5 12/12/2024     No results found for: "IRON", "TRANSFERRIN", "TIBC", "FESATURATED", "FERRITIN"     Plan:   Chronic deep vein thrombosis (DVT) of proximal vein of left lower extremity  -     rivaroxaban (XARELTO) 10 mg Tab; Take 1 tablet (10 mg total) by mouth daily with dinner or evening meal.  Dispense: 90 tablet; Refill: 3  -     CBC w/ DIFF; Future; Expected date: 12/12/2024  -     CMP; Future; Expected date: 12/12/2024    Bilateral pulmonary embolism  -     rivaroxaban (XARELTO) 10 mg Tab; Take 1 tablet (10 mg total) by mouth daily with dinner or evening meal.  Dispense: 90 tablet; Refill: 3  -     CBC w/ DIFF; Future; Expected date: 12/12/2024  -     CMP; Future; Expected date: 12/12/2024    Tobacco use    Class 3 severe obesity due to excess calories with body mass index (BMI) of 40.0 to 44.9 in adult, unspecified whether serious comorbidity present    Chronic fatigue  -    "  Ambulatory referral/consult to Sleep Disorders; Future; Expected date: 12/19/2024    Sleep disturbance  -     Ambulatory referral/consult to Sleep Disorders; Future; Expected date: 12/19/2024    Mr. Serrato had an extensive left LE DVT in in May 2022 with no clear provoking factor, however patient is both a smoker and morbidly obese. He is compliant with Xarelto with no bleeding events. 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 Refilled Xarelto.    Discusses smoking cessation but he is not ready at this time    Referral to Sleep Medicine for chronic fatigue and sleep disturbance.     Will follow-up with labs and IZZY visit in one year.       BMT Chart Routing      Follow up with physician    Follow up with IZZY 1 year. one year wit labs prior   Provider visit type Benign hem   Infusion scheduling note   NA   Injection scheduling note NA   Labs CMP and CBC   Scheduling:  Preferred lab:  Lab interval:  CBC and CMP   Imaging   NA   Pharmacy appointment No pharmacy appointment needed      Other referrals no referral to Oncology Primary Care needed -  no Massage appointment needed    Additional referrals needed  Sleep Medicine      Collaborating physician: Dr. Rosemary Gill, MSN, FNP

## 2025-02-10 ENCOUNTER — PATIENT MESSAGE (OUTPATIENT)
Dept: SLEEP MEDICINE | Facility: CLINIC | Age: 30
End: 2025-02-10
Payer: COMMERCIAL

## 2025-05-29 ENCOUNTER — TELEPHONE (OUTPATIENT)
Dept: HEMATOLOGY/ONCOLOGY | Facility: CLINIC | Age: 30
End: 2025-05-29
Payer: COMMERCIAL

## 2025-06-27 NOTE — PROGRESS NOTES
[facility] Medicine  Progress Note    Patient Name: Colten Serrato  MRN: [id number]  Patient Class: IP- Inpatient   Admission Date: 5/13/2022  Length of Stay: 2 days  Attending Physician: Oumou Larson MD  Primary Care Provider: Provider [facility]        Subjective:     Principal Problem:Acute deep vein thrombosis (DVT) of left lower extremity        HPI:  26 y.o. male with HTN, ADHD, tobacco use, obesity, and history of DVT as a child presents with a complaint of left lower ext pain.  Associated with some swelling, acute onset, described as similar to prior DVT, no known exacerbating or alleviating factors.  Denies fever, chills, cough, SOB, chest pain, palpitations, dizziness syncope, nausea,, diarrhea, abdominal pain complaining of black stools, dysuria.  In the ED he was found to have extensive DVT and bilateral PE.  Tachycardia noted.  Otherwise unremarkable for acute abnormality.  Started on full-dose enoxaparin.      Overview/Hospital Course:  26 year old man with history of hypertension, ADHD, obesity, tobacco use, upper extremity DVT as a baby who presented for evaluation of left leg pain.  He was diagnosed with extensive DVT in left leg and bilateral pulmonary emboli.  He has no chest pain or shortness of breath.  Echo does not show any evidence of right heart strain.  He has severe pain in his left leg requiring iv morphine.  Dr Marshall consulted for thrombectomy on 5/16. Eliquis sent to pharmacy for price check. Of note this is an unprovoked DVT, his father had blood clots and he had an upper extremity DVT as a child.  He will need close follow up with hematology oncology for hypercoagulable workup.      Interval History: Complains of thirst this morning. No other complaints. Understands plan for thrombectomy today.     Review of Systems   Constitutional:  Negative for fever.   Respiratory:  Negative for cough, chest tightness and shortness of breath.    Cardiovascular:   No care due was identified.  [facility] Embedded Care Due Messages. Reference number: [id number].   6/27/2025 6:44:17 AM CDT   Positive for leg swelling. Negative for chest pain and palpitations.   Gastrointestinal:  Negative for abdominal pain, constipation, diarrhea, nausea and vomiting.   Objective:     Vital Signs (Most Recent):  Temp: 98.8 °F (37.1 °C) (05/16/22 0716)  Pulse: 97 (05/16/22 0716)  Resp: 18 (05/16/22 0848)  BP: 135/71 (05/16/22 0849)  SpO2: 97 % (05/16/22 0716) Vital Signs (24h Range):  Temp:  [97.9 °F (36.6 °C)-99.9 °F (37.7 °C)] 98.8 °F (37.1 °C)  Pulse:  [] 97  Resp:  [16-20] 18  SpO2:  [94 %-99 %] 97 %  BP: (126-144)/(71-83) 135/71     Weight: 115.1 kg (253 lb 11.2 oz)  Body mass index is 38.58 kg/m².    Intake/Output Summary (Last 24 hours) at 5/16/2022 1051  Last data filed at 5/16/2022 0500  Gross per 24 hour   Intake 480 ml   Output 1805 ml   Net -1325 ml      Physical Exam  Vitals and nursing note reviewed.   Constitutional:       General: He is not in acute distress.     Appearance: He is obese. He is not ill-appearing or toxic-appearing.   HENT:      Head: Normocephalic and atraumatic.      Nose: Nose normal.      Mouth/Throat:      Mouth: Mucous membranes are moist.   Cardiovascular:      Rate and Rhythm: Normal rate and regular rhythm.      Pulses: Normal pulses.      Heart sounds: Normal heart sounds. No murmur heard.    No gallop.   Pulmonary:      Effort: Pulmonary effort is normal. No respiratory distress.      Breath sounds: Normal breath sounds. No wheezing or rales.   Abdominal:      General: Bowel sounds are normal. There is no distension.      Palpations: Abdomen is soft.      Tenderness: There is no abdominal tenderness. There is no guarding.   Musculoskeletal:      Right lower leg: No edema.      Left lower leg: Edema present.   Skin:     General: Skin is warm and dry.   Neurological:      Mental Status: He is alert. Mental status is at baseline.       Significant Labs: All pertinent labs within the past 24 hours have been reviewed.    Significant Imaging: I have reviewed all pertinent  imaging results/findings within the past 24 hours.      Assessment/Plan:      * Acute deep vein thrombosis (DVT) of left lower extremity  -Doppler US of left leg showed thrombosis of all of the deep veins in the left lower extremity  -CTA chest showed bilateral acute pulmonary emboli with pulmonary trunk greater in caliber than the corresponding ascending thoracic aorta and subtle bilateral ground-glass opacities.  ?early pulmonary infarct?  -Troponin and BNP are normal.  -Echo was normal without evidence of right heart strain.  -Etiology of VTE unclear.  Appears unprovoked.  No prolonged immobility or trauma.  Notes his father had history of blood clots.  -Will need outpatient workup for hypercoagulable states.  -Dr Marshall consulted- plan thrombectomy today  -Continue full dose lovenox  - Eliquis sent to pharmacy for price check today   - Anticipate discharge tomorrow     Tobacco use  -Counselled on cessation 8 minutes.  -NRT ordered.    Class 2 severe obesity with serious comorbidity in adult  Body mass index is 38.58 kg/m². Morbid obesity complicates all aspects of disease management from diagnostic modalities to treatment. Weight loss encouraged and health benefits explained to patient.     Essential hypertension  -Well controlled  -Continue home propranolol.    Bilateral pulmonary embolism  -Treatment as above.      VTE Risk Mitigation (From admission, onward)         Ordered     Reason for No Pharmacological VTE Prophylaxis  Once        Question:  Reasons:  Answer:  Already adequately anticoagulated on oral Anticoagulants    05/13/22 2129     IP VTE HIGH RISK PATIENT  Once         05/13/22 2129     Place sequential compression device  Until discontinued         05/13/22 2129     enoxaparin injection 105 mg  Every 12 hours (non-standard times)         05/13/22 2129                Discharge Planning   PAOLO:      Code Status: Full Code   Is the patient medically ready for discharge?:     Reason for patient still in  hospital (select all that apply): Patient trending condition  Discharge Plan A: Home with family          11:03 AM  Discussed with social work- eliquis 1st month free, then $10/month.      Oumou Larson MD  Department of Hospital Medicine   [facility]

## (undated) DEVICE — GUIDEWIRE V-18 CONTROL .18

## (undated) DEVICE — Device

## (undated) DEVICE — OMNIPAQUE 350MG 150ML VIAL

## (undated) DEVICE — SHEATH CLOTTRIEVER 13FR

## (undated) DEVICE — GUIDEWIRE STIFF ANG .035 X 260

## (undated) DEVICE — KIT INTRODUCER MICROPUNCTR 4F

## (undated) DEVICE — CATH VISIONS PV IVUS .035

## (undated) DEVICE — PACK CATH LAB

## (undated) DEVICE — CONTRAST VISIPAQUE 150ML

## (undated) DEVICE — KIT SYR REUSABLE

## (undated) DEVICE — KIT HAND CONTROL HIGH PRESSUR

## (undated) DEVICE — GUIDEWIRE SAFE T J TIP 145X2.5

## (undated) DEVICE — CATH CLOTTRIEVER 13FR 16MM

## (undated) DEVICE — GUIDEWIRE AMPLATZ .035X260

## (undated) DEVICE — KIT MANIFOLD LOW PRESS TUBING

## (undated) DEVICE — CATH ULTRAVERSE 035 8X40X75

## (undated) DEVICE — INTRODUCER PINNACLE .035X8X10C

## (undated) DEVICE — GUIDEWIRE MICRO ACCESS

## (undated) DEVICE — CATH 5FR MP 125CM 5/BX